# Patient Record
Sex: MALE | Race: WHITE | Employment: FULL TIME | URBAN - METROPOLITAN AREA
[De-identification: names, ages, dates, MRNs, and addresses within clinical notes are randomized per-mention and may not be internally consistent; named-entity substitution may affect disease eponyms.]

---

## 2020-03-19 ENCOUNTER — OFFICE VISIT (OUTPATIENT)
Dept: ORTHOPEDIC SURGERY | Age: 65
End: 2020-03-19
Payer: COMMERCIAL

## 2020-03-19 VITALS
SYSTOLIC BLOOD PRESSURE: 136 MMHG | HEART RATE: 67 BPM | DIASTOLIC BLOOD PRESSURE: 84 MMHG | TEMPERATURE: 97.9 F | WEIGHT: 265 LBS | HEIGHT: 70 IN | BODY MASS INDEX: 37.94 KG/M2

## 2020-03-19 PROCEDURE — 99204 OFFICE O/P NEW MOD 45 MIN: CPT | Performed by: ORTHOPAEDIC SURGERY

## 2020-03-19 RX ORDER — AMLODIPINE BESYLATE 10 MG/1
10 TABLET ORAL DAILY
COMMUNITY
Start: 2020-01-15

## 2020-03-19 RX ORDER — LOSARTAN POTASSIUM AND HYDROCHLOROTHIAZIDE 12.5; 1 MG/1; MG/1
1 TABLET ORAL DAILY
COMMUNITY
Start: 2020-01-15

## 2020-03-19 RX ORDER — SOY ISOFLAVONE 40 MG
500 TABLET ORAL 2 TIMES DAILY
COMMUNITY

## 2020-03-19 RX ORDER — BISOPROLOL FUMARATE 5 MG/1
5 TABLET ORAL DAILY
COMMUNITY
Start: 2020-01-15

## 2020-03-19 RX ORDER — AMOXICILLIN 500 MG
1 CAPSULE ORAL 2 TIMES DAILY
COMMUNITY

## 2020-03-19 NOTE — LETTER
Jean Pierre Vinson M.D. / Jenny Fuller. Meagan Ling M.D. Orthopaedic Surgeons - Board Certified  [address]  [address], [address], [address] [place] [address]  Phone:  [phone]    Fax:   [phone]    Ko Libby  Type of Procedure: LEFT TOTAL KNEE REPLACEMENT (Conformis)  Place of Surgery: [facility] Jazmine Wilkerson  Date of Surgery: 6/22/2020  Time of Surgery: 7:30 a.m. PREOPERATIVE INSTRUCTIONS FOR TOTAL JOINT REPLACEMENT  1. Read all the information provided for you in this packet and joint replacement folder; retain it for 2 years following surgery. 2.  Follow Dietary Handout: Patient Education Guide: Iron Replacement   Begin eating foods rich in iron one month before your surgery and continue for one month after surgery. 3.  Optional:  Not required by Dr. Meagan Ling. You may choose an over the counter iron supplement and start taking it at least one month prior to surgery and continue taking the iron supplement for one month following surgery. Feel free to contact your primary care physician for his / her approval or for a prescription. 4.  A nurse from [facility] will contact you and inform you of a date and time for your Pre-Admission Testing Day. You may contact them at [phone] [facility]) or [phone] [facility]) for any special requests. 5. Inform your family doctor as soon as your surgery date has been scheduled. See your doctor before surgery and obtain a letter of medical clearance for our office. Notify any specialists you are seeing and obtain a letter of clearance. You must see your pulmonary specialist prior to surgery if you have a condition called Sleep Apnea or use a CPAP machine at night. Please inform us if you have had problems with anesthesia in the past; especially with intubation. 6.  If you do not see a dentist regularly, see your dentist prior to surgery for a dental checkup and cleaning.  Please have all dental

## 2020-03-24 NOTE — PROGRESS NOTES
Chief Complaint:   Chief Complaint   Patient presents with    Knee Pain     Lt knee pain. Hx of knee scope 2013 for meniscus tear. Pain came back approx 3 years ago. Records in Media. States X-rays done last summer. No disc with patient. CONNIE Jackson is a 62 y.o. male, who presents for evaluation of left knee pain, symptoms have been chronic for the last few years, history is notable for left knee arthroscopic meniscectomy about 7 years ago since which time the patient has had occasional but usually tolerable symptoms. More recently the symptoms have become more consistent and progressive at times disabling the patient from independent activities of standing walking stairs transfers etc., no history of mechanical locking giving way. No recent trauma. Patient has occasionally tried some over-the-counter's with mixed results. He denies fever chills sweats chest pain dyspnea, has no other joint complaints. Allergies; medications; past medical, surgical, family, and social history; and problem list have been reviewed today and updated as indicated in this encounter - see below following Ortho specifics. Musculoskeletal: Obese otherwise healthy-appearing middle-aged male. Upper extremities are grossly intact leg lengths are equal and hip motion is painless bilaterally. Both knees show mild varus alignment more left than right knee, on the left side the deformity is partially correctable to neutral in mid flexion, there is no effusion there is mild crepitus and medial tenderness of the left more than right knee. Range of motion is normal although limited at the extremes of few degrees on both extension and flexion in the left knee.     Radiologic Studies: Weightbearing AP x-rays of both knees including lateral of the left today show varus deformities of both knees with early to moderate medial narrowing on the right, on the left side there is more advanced loss of medial cartilage with subchondral sclerosis and early osteophyte formation consistent with varus DJD bilateral knees left more than right. ASSESSMENT/PLAN:    Kim Floyd was seen today for knee pain. Diagnoses and all orders for this visit:    Primary osteoarthritis of left knee  -     XR Knee Bilateral Standing; Future  -     XR KNEE LEFT (1-2 VIEWS); Future       Treatment options were reviewed, at this point the patient is comfortable with observation and activity modification, we talked about injections and the possible progression toward eventual knee arthroplasty, weight loss is strongly advised as an imperative in either event. Low impact exercise also advised and instructed, questions asked and answered follow-up in 3 months if symptoms persist for further assessment possible injection possible total knee. Return in about 3 months (around 6/16/2020). Luisa Szymanski MD    3/24/2020  2:55 PM      There is no problem list on file for this patient. Past Medical History:   Diagnosis Date    Hypertension        Past Surgical History:   Procedure Laterality Date    FACIAL FRACTURE SURGERY      Maxilla    KNEE ARTHROSCOPY Left 2013    SHOULDER SURGERY Left 1981       Current Outpatient Medications   Medication Sig Dispense Refill    amLODIPine (NORVASC) 10 MG tablet       bisoprolol (ZEBETA) 5 MG tablet       losartan-hydrochlorothiazide (HYZAAR) 100-12.5 MG per tablet       Omega-3 Fatty Acids (FISH OIL) 1200 MG CAPS Take 1 capsule by mouth 2 times daily      Glucosamine-Chondroit-Vit C-Mn (GLUCOSAMINE CHONDR 1500 COMPLX PO) Take 1 capsule by mouth 2 times daily      l-arginine 500 MG capsule Take 500 mg by mouth 2 times daily      Cholecalciferol (VITAMIN D3 PO) Take 1 capsule by mouth 2 times daily      Multiple Vitamins-Minerals (CENTRUM ADULTS PO) Take 1 capsule by mouth daily       No current facility-administered medications for this visit.         Allergies   Allergen Reactions    Neosporin Effort normal. No stridor. No respiratory distress, no wheezes. Abdominal:  No abnormal distension. Neurological: Alert and oriented to person, place, and time. Skin: Skin is warm and dry. Psychiatric: Normal mood and affect.  Behavior is normal. Thought content normal.

## 2020-04-08 ENCOUNTER — TELEPHONE (OUTPATIENT)
Dept: ORTHOPEDIC SURGERY | Age: 65
End: 2020-04-08

## 2020-04-08 NOTE — TELEPHONE ENCOUNTER
LM on patient's VM regarding changing surgery date to July due to CT scan for Conformis needing to be done 6 weeks prior to surgery and due to Covid may not be able to schedule until end of May.

## 2020-04-13 ENCOUNTER — TELEPHONE (OUTPATIENT)
Dept: ORTHOPEDIC SURGERY | Age: 65
End: 2020-04-13

## 2020-05-19 ENCOUNTER — TELEPHONE (OUTPATIENT)
Dept: ORTHOPEDIC SURGERY | Age: 65
End: 2020-05-19

## 2020-05-20 ENCOUNTER — TELEPHONE (OUTPATIENT)
Dept: ORTHOPEDIC SURGERY | Age: 65
End: 2020-05-20

## 2020-05-20 NOTE — TELEPHONE ENCOUNTER
Spoke with Susana Juan @ [employer] [facility]. [facility]KA done as OPT or OBS.     Call Ref# for OPT [id number]  Call Ref# for OBS [id number]

## 2020-06-01 ENCOUNTER — HOSPITAL ENCOUNTER (OUTPATIENT)
Dept: CT IMAGING | Age: 65
Discharge: HOME | End: 2020-06-03
Payer: COMMERCIAL

## 2020-06-01 ENCOUNTER — TELEPHONE (OUTPATIENT)
Dept: ORTHOPEDIC SURGERY | Age: 65
End: 2020-06-01

## 2020-06-01 ENCOUNTER — HOSPITAL ENCOUNTER (OUTPATIENT)
Dept: PREADMISSION TESTING | Age: 65
Discharge: HOME | End: 2020-06-01
Payer: COMMERCIAL

## 2020-06-01 VITALS
HEIGHT: 70 IN | SYSTOLIC BLOOD PRESSURE: 139 MMHG | RESPIRATION RATE: 20 BRPM | TEMPERATURE: 98.5 F | WEIGHT: 268 LBS | BODY MASS INDEX: 38.37 KG/M2 | HEART RATE: 60 BPM | DIASTOLIC BLOOD PRESSURE: 66 MMHG | OXYGEN SATURATION: 95 %

## 2020-06-01 PROCEDURE — 73700 CT LOWER EXTREMITY W/O DYE: CPT

## 2020-06-01 RX ORDER — LORATADINE 10 MG/1
10 CAPSULE, LIQUID FILLED ORAL PRN
COMMUNITY

## 2020-06-01 ASSESSMENT — KOOS JR
GOING UP OR DOWN STAIRS: 2
STRAIGHTENING KNEE FULLY: 3
HOW SEVERE IS YOUR KNEE STIFFNESS AFTER FIRST WAKING IN MORNING: 3
RISING FROM SITTING: 3
TWISING OR PIVOTING ON KNEE: 4
STANDING UPRIGHT: 2
BENDING TO THE FLOOR TO PICK UP OBJECT: 2

## 2020-06-01 ASSESSMENT — PAIN DESCRIPTION - LOCATION: LOCATION: KNEE

## 2020-06-01 ASSESSMENT — PAIN SCALES - GENERAL: PAINLEVEL_OUTOF10: 4

## 2020-06-01 ASSESSMENT — PAIN DESCRIPTION - ORIENTATION: ORIENTATION: LEFT

## 2020-06-01 ASSESSMENT — PAIN DESCRIPTION - PAIN TYPE: TYPE: CHRONIC PAIN

## 2020-06-02 ENCOUNTER — ANESTHESIA EVENT (OUTPATIENT)
Dept: OPERATING ROOM | Age: 65
End: 2020-06-02
Payer: COMMERCIAL

## 2020-06-04 ENCOUNTER — TELEPHONE (OUTPATIENT)
Dept: ORTHOPEDIC SURGERY | Age: 65
End: 2020-06-04

## 2020-06-06 RX ORDER — ROPIVACAINE HYDROCHLORIDE 5 MG/ML
30 INJECTION, SOLUTION EPIDURAL; INFILTRATION; PERINEURAL ONCE
Status: CANCELLED | OUTPATIENT
Start: 2020-06-06

## 2020-06-06 RX ORDER — MIDAZOLAM HYDROCHLORIDE 1 MG/ML
1 INJECTION INTRAMUSCULAR; INTRAVENOUS PRN
Status: CANCELLED | OUTPATIENT
Start: 2020-06-06

## 2020-06-06 RX ORDER — CELECOXIB 100 MG/1
200 CAPSULE ORAL ONCE
Status: CANCELLED | OUTPATIENT
Start: 2020-06-06 | End: 2020-06-06

## 2020-06-06 RX ORDER — OXYCODONE HCL 10 MG/1
10 TABLET, FILM COATED, EXTENDED RELEASE ORAL ONCE
Status: CANCELLED | OUTPATIENT
Start: 2020-06-06 | End: 2020-06-06

## 2020-06-06 RX ORDER — GABAPENTIN 300 MG/1
600 CAPSULE ORAL ONCE
Status: CANCELLED | OUTPATIENT
Start: 2020-06-06 | End: 2020-06-06

## 2020-06-06 RX ORDER — ACETAMINOPHEN 500 MG
1000 TABLET ORAL ONCE
Status: CANCELLED | OUTPATIENT
Start: 2020-06-06 | End: 2020-06-06

## 2020-06-06 RX ORDER — FENTANYL CITRATE 50 UG/ML
50 INJECTION, SOLUTION INTRAMUSCULAR; INTRAVENOUS PRN
Status: CANCELLED | OUTPATIENT
Start: 2020-06-06

## 2020-06-24 ASSESSMENT — PROMIS GLOBAL HEALTH SCALE
IN GENERAL, WOULD YOU SAY YOUR QUALITY OF LIFE IS...[ON A SCALE OF 1 (POOR) TO 5 (EXCELLENT)]: 2
IN GENERAL, HOW WOULD YOU RATE YOUR MENTAL HEALTH, INCLUDING YOUR MOOD AND YOUR ABILITY TO THINK [ON A SCALE OF 1 (POOR) TO 5 (EXCELLENT)]?: 3
WHO IS THE PERSON COMPLETING THE PROMIS V1.1 SURVEY?: 0
IN GENERAL, HOW WOULD YOU RATE YOUR SATISFACTION WITH YOUR SOCIAL ACTIVITIES AND RELATIONSHIPS [ON A SCALE OF 1 (POOR) TO 5 (EXCELLENT)]?: 2
IN THE PAST 7 DAYS, HOW WOULD YOU RATE YOUR PAIN ON AVERAGE [ON A SCALE FROM 0 (NO PAIN) TO 10 (WORST IMAGINABLE PAIN)]?: 6
IN THE PAST 7 DAYS, HOW WOULD YOU RATE YOUR FATIGUE ON AVERAGE [ON A SCALE FROM 1 (NONE) TO 5 (VERY SEVERE)]?: 3
HOW IS THE PROMIS V1.1 BEING ADMINISTERED?: 2
IN GENERAL, HOW WOULD YOU RATE YOUR PHYSICAL HEALTH [ON A SCALE OF 1 (POOR) TO 5 (EXCELLENT)]?: 2
IN GENERAL, WOULD YOU SAY YOUR HEALTH IS...[ON A SCALE OF 1 (POOR) TO 5 (EXCELLENT)]: 3
IN GENERAL, PLEASE RATE HOW WELL YOU CARRY OUT YOUR USUAL SOCIAL ACTIVITIES (INCLUDES ACTIVITIES AT HOME, AT WORK, AND IN YOUR COMMUNITY, AND RESPONSIBILITIES AS A PARENT, CHILD, SPOUSE, EMPLOYEE, FRIEND, ETC) [ON A SCALE OF 1 (POOR) TO 5 (EXCELLENT)]?: 2
IN THE PAST 7 DAYS, HOW OFTEN HAVE YOU BEEN BOTHERED BY EMOTIONAL PROBLEMS, SUCH AS FEELING ANXIOUS, DEPRESSED, OR IRRITABLE [ON A SCALE FROM 1 (NEVER) TO 5 (ALWAYS)]?: 3
TO WHAT EXTENT ARE YOU ABLE TO CARRY OUT YOUR EVERYDAY PHYSICAL ACTIVITIES SUCH AS WALKING, CLIMBING STAIRS, CARRYING GROCERIES, OR MOVING A CHAIR [ON A SCALE OF 1 (NOT AT ALL) TO 5 (COMPLETELY)]?: 2

## 2020-06-24 ASSESSMENT — KOOS JR
BENDING TO THE FLOOR TO PICK UP OBJECT: 2
STRAIGHTENING KNEE FULLY: 2
HOW SEVERE IS YOUR KNEE STIFFNESS AFTER FIRST WAKING IN MORNING: 2
RISING FROM SITTING: 2
STANDING UPRIGHT: 2
GOING UP OR DOWN STAIRS: 3
TWISING OR PIVOTING ON KNEE: 1

## 2020-07-01 ENCOUNTER — TELEPHONE (OUTPATIENT)
Dept: ORTHOPEDIC SURGERY | Age: 65
End: 2020-07-01

## 2020-07-01 NOTE — TELEPHONE ENCOUNTER
7/01/2020 8:55 am Fax received from Dr. Anamika Dumont [phone] which includes:  An EKG strip dated 6/29/2020 Sinus Bradycardia HR 59. Labs completed at [facility]: COVID-19 IgG - Negative, SARS CoV2 - Negative, Nares culture - Negative MRSA, Prealbumin 23.7, Vitamin D Lvl 52.57 - WNL, CMP, PSA 1.190 - WNL, TSH 0.850 - WNL, CBC w/ Diff (Hgb. 15.3 Hct. 45.6 WBC 8.1 plt ct 251)    Above scanned to media

## 2020-07-02 ENCOUNTER — PREP FOR PROCEDURE (OUTPATIENT)
Dept: ORTHOPEDIC SURGERY | Age: 65
End: 2020-07-02

## 2020-07-02 DIAGNOSIS — M17.12 PRIMARY OSTEOARTHRITIS OF LEFT KNEE: Primary | ICD-10-CM

## 2020-07-02 RX ORDER — SODIUM CHLORIDE 0.9 % (FLUSH) 0.9 %
10 SYRINGE (ML) INJECTION PRN
Status: CANCELLED | OUTPATIENT
Start: 2020-07-02

## 2020-07-02 RX ORDER — GABAPENTIN 100 MG/1
600 CAPSULE ORAL ONCE
Status: CANCELLED | OUTPATIENT
Start: 2020-07-02 | End: 2020-07-02

## 2020-07-02 RX ORDER — ACETAMINOPHEN 325 MG/1
1000 TABLET ORAL ONCE
Status: CANCELLED | OUTPATIENT
Start: 2020-07-02 | End: 2020-07-02

## 2020-07-02 RX ORDER — SODIUM CHLORIDE 0.9 % (FLUSH) 0.9 %
10 SYRINGE (ML) INJECTION EVERY 12 HOURS SCHEDULED
Status: CANCELLED | OUTPATIENT
Start: 2020-07-02

## 2020-07-02 RX ORDER — CELECOXIB 200 MG/1
200 CAPSULE ORAL ONCE
Status: CANCELLED | OUTPATIENT
Start: 2020-07-02 | End: 2020-07-02

## 2020-07-02 RX ORDER — SODIUM CHLORIDE 9 MG/ML
INJECTION, SOLUTION INTRAVENOUS CONTINUOUS
Status: CANCELLED | OUTPATIENT
Start: 2020-07-02

## 2020-07-09 ASSESSMENT — KOOS JR
BENDING TO THE FLOOR TO PICK UP OBJECT: 2
GOING UP OR DOWN STAIRS: 3
STRAIGHTENING KNEE FULLY: 2
HOW SEVERE IS YOUR KNEE STIFFNESS AFTER FIRST WAKING IN MORNING: 1
RISING FROM SITTING: 2
STANDING UPRIGHT: 2
TWISING OR PIVOTING ON KNEE: 2

## 2020-07-09 NOTE — PROGRESS NOTES
Spoke with pt regarding his covid test that was done. Pt had antibody test done not covid screen swab. Pt is going today to have correct test done at hospital where he lives.

## 2020-07-09 NOTE — PROGRESS NOTES
Have you been tested for COVID  Yes         07/08/2020 preop test PCP    Have you been told you were positive for COVID No  Have you had any known exposure to someone that is positive for COVID No  Do you have a cough                   No              Do you have shortness of breath No                 Do you have a sore throat            No                Are you having chills                    No                Are you having muscle aches. No                    Please come to the hospital wearing a mask and have your significant other wear a mask as well. Both of you should check your temperature before leaving to come here,  if it is 100 or higher please call [phone] for instruction.

## 2020-07-09 NOTE — PROGRESS NOTES
[facility] PRE-ADMISSION TESTING INSTRUCTIONS    The Preadmission Testing patient is instructed accordingly using the following criteria (check applicable):    ARRIVAL INSTRUCTIONS:  [x] Parking the day of Surgery is located in the Main Entrance lot. Upon entering the door, make an immediate right to the surgery reception desk    [x] Bring photo ID and insurance card    [] Bring in a copy of Living will or Durable Power of  papers. [x] Please be sure to arrange for responsible adult to provide transportation to and from the hospital    [] Please arrange for responsible adult to be with you for the 24 hour period post procedure due to having anesthesia      GENERAL INSTRUCTIONS:    [x] Nothing by mouth after midnight, including gum, candy, mints or water    [] You may brush your teeth, but do not swallow any water    [x] Take medications as instructed with 1-2 oz of water    [x] Stop herbal supplements and vitamins 5 days prior to procedure    [x] Follow preop dosing of blood thinners per physician instructions    [] Take 1/2 dose of evening insulin, but no insulin after midnight    [] No oral diabetic medications after midnight    [] If diabetic and have low blood sugar or feel symptomatic, take 1-2oz apple juice only    [] Bring inhalers day of surgery    [] Bring C-PAP/ Bi-Pap day of surgery    [] Bring urine specimen day of surgery    [] Shower or bath with soap, lather and rinse well, AM of Surgery, no lotion, powders or creams to surgical site    [] Follow bowel prep as instructed per surgeon    [x] No tobacco products within 24 hours of surgery     [x] No alcohol or illegal drug use within 24 hours of surgery.     [x] Jewelry, body piercing's, eyeglasses, contact lenses and dentures are not permitted into surgery (bring cases)      [] Please do not wear any nail polish, make up or hair products on the day of surgery    [x] If not already done, you can expect a call from

## 2020-07-10 ENCOUNTER — TELEPHONE (OUTPATIENT)
Dept: ORTHOPEDIC SURGERY | Age: 65
End: 2020-07-10

## 2020-07-10 NOTE — TELEPHONE ENCOUNTER
Please check with PAT for policy for elective surgery without covid results - I believe we can move surgery to 7/20 if not having results by 7/13 is an issue.

## 2020-07-10 NOTE — TELEPHONE ENCOUNTER
7/10/2020 10:35 am Proceed with surgery, Covid test is pending    7/10/2020 10:40 am Per Dr. Tracy Kim request: Phoned and spoke with patient who denies: fever, chills, coughing, SOB or body aches. Family is well. Patient denies travel outside of the country and denies exposure to anybody who has traveled outside the country.     7/10/2020 10:44 am Phoned and informed Mark Jacques RN, PAT-RD of above

## 2020-07-10 NOTE — TELEPHONE ENCOUNTER
7/10/2020 9:30 am Received a fax from [facility] [phone]: A copy of office notes dated July 1, 2020 and electronically sgned by Milagros Schwab DO. \"Cleared\" for surgery. \"    7/10/2020 9:38 am Above faxed to [facility] [phone]

## 2020-07-10 NOTE — H&P (VIEW-ONLY)
Department of Orthopedic Surgery  Attending History and Physical        CHIEF COMPLAINT: Left knee pain    Reason for Admission: Left total knee arthroplasty    History Obtained From: patient, electronic medical record    HISTORY OF PRESENT ILLNESS:      The patient is a 60 y.o. male with significant past medical history of hypertension and postoperative nausea and vomiting who presents with left knee pain, symptoms have been chronic for the last few years, history is notable for left knee arthroscopic meniscectomy about 7 years ago since which time the patient has had occasional but usually tolerable symptoms. More recently the symptoms have become more consistent and progressive at times disabling the patient from independent activities of standing walking stairs transfers etc., no history of mechanical locking giving way. No recent trauma. Patient has occasionally tried some over-the-counter's with mixed results. He denies fever chills sweats chest pain dyspnea, has no other joint complaints.     Past Medical History:        Diagnosis Date    Arthritis     Hypertension     PONV (postoperative nausea and vomiting)      Past Surgical History:        Procedure Laterality Date    FACIAL FRACTURE SURGERY      Maxilla    KNEE ARTHROSCOPY Left 2013    SHOULDER SURGERY Left 1981    WISDOM TOOTH EXTRACTION       Immunizations:              Influenza:  Not indicated            Pneumococcal Polysaccharide:  Not indicated    Medications Prior to Admission:     Current Outpatient Medications:     loratadine (CLARITIN) 10 MG capsule, Take 10 mg by mouth as needed, Disp: , Rfl:     amLODIPine (NORVASC) 10 MG tablet, Take 10 mg by mouth daily Instructed to take am of procedure, Disp: , Rfl:     bisoprolol (ZEBETA) 5 MG tablet, Take 5 mg by mouth daily Instructed to take am of procedure, Disp: , Rfl:     losartan-hydrochlorothiazide (HYZAAR) 100-12.5 MG per tablet, Take 1 tablet by mouth daily , Disp: , Rfl:    Omega-3 Fatty Acids (FISH OIL) 1200 MG CAPS, Take 1 capsule by mouth 2 times daily Ld 7/8/2020, Disp: , Rfl:     Glucosamine-Chondroit-Vit C-Mn (GLUCOSAMINE CHONDR 1500 COMPLX PO), Take 1 capsule by mouth 2 times daily Ld 7/8/2020, Disp: , Rfl:     l-arginine 500 MG capsule, Take 500 mg by mouth 2 times daily Ld 7/8/2020, Disp: , Rfl:     Cholecalciferol (VITAMIN D3 PO), Take 1 capsule by mouth 2 times daily Ld 7/8/2020, Disp: , Rfl:     Multiple Vitamins-Minerals (CENTRUM ADULTS PO), Take 1 capsule by mouth daily Ld 7/8/2020, Disp: , Rfl:       Allergies:  Neosporin [neomycin-polymyxin-gramicidin]    Social History:   TOBACCO:   reports that he has never smoked. He has never used smokeless tobacco.  ETOH:   reports current alcohol use of about 4.0 standard drinks of alcohol per week. Patient currently lives in a private home in [place] than 2 hours away. Family History:   No family history on file. REVIEW OF SYSTEMS:  Constitutional: Negative for chills, diaphoresis, fatigue, fever and unexpected weight change. Respiratory: Negative for cough, shortness of breath and wheezing. Cardiovascular: Negative for chest pain and palpitations. Neurological: Negative for dizziness, syncope, weakness and numbness. Musculoskeletal: see HPI   All other systems on review negative or noncontributory. PHYSICAL EXAM:  VITALS:  There were no vitals taken for this visit. CONSTITUTIONAL: Healthy middle-aged male  NECK:  supple, symmetrical, trachea midline  SKIN: intact no rashes sores ulcers. LUNGS:  clear no audible rales or rhonchi  CARDIOVASCULAR:  Regular rate and rhythm  MUSCULOSKELETAL:  Upper extremities are grossly intact leg lengths are equal and hip motion is painless bilaterally.   Both knees show mild varus alignment more left than right knee, on the left side the deformity is partially correctable to neutral in mid flexion, there is no effusion there is mild crepitus and medial tenderness of the left more than right knee. Range of motion is normal although limited at the extremes of few degrees on both extension and flexion in the left knee. NEUROLOGIC:  Grossly intact motor and sensation cranial and all extremities  MENTAL STATUS:  Level of Alertness:   awake  ORIENTATION:  to person, place, time    DATA:  CBC with Differential:  No results found for: WBC, RBC, HGB, HCT, PLT, MCV, MCH, MCHC, RDW, NRBC, SEGSPCT, BANDSPCT, BLASTSPCT, METASPCT, LYMPHOPCT, PROMYELOPCT, MONOPCT, MYELOPCT, EOSPCT, BASOPCT, MONOSABS, LYMPHSABS, EOSABS, BASOSABS, DIFFTYPE    Radiographs:  Weightbearing AP x-rays of both knees including lateral of the left today show varus deformities of both knees with early to moderate medial narrowing on the right, on the left side there is more advanced loss of medial cartilage with subchondral sclerosis and early osteophyte formation consistent with varus DJD bilateral knees left more than right. ASSESSMENT AND PLAN:    Primary osteoarthritis of left knee    The alternative of total knee arthroplasty was reviewed with the patient including the anticipated procedure itself, likely risks benefits and probable outcomes, we also discussed at his request the option of a customizable implant and details pertinent to that. Patient states he has researched this alternative relative to more conventional procedures including no use of robotics and requests the approach of patient specific implants. This will be performed on an admission basis anticipating a 2 night stay primarily due to the patient's home being significant distance from the hospital facility. It is the physician judgment that the procedure should be done as an inpatient because of severe systemic disease and or residents distance from the facility that precludes the patient from meeting ASA ambulatory procedure guidelines, and patient safety would be impacted if performed at a lower level of care.

## 2020-07-10 NOTE — TELEPHONE ENCOUNTER
7/10/2020 It was reported by LUCAS on 7/09/2020: The wrong Covid-19 test was ordered by the patient's PCP which was done on 6/29/2020 - negative for antibodies. A nasal smear is the correct test.    7/10/2020 8:37 am Phoned and spoke with patient who reports: Had the nasal smear done late yesterday 7/09/2020 at the hospital where I live. The  informed patient: the results of the test takes 4 days and would not be available in time for his surgery. Patient reports he has made plans for this surgery and arrangements have been made for the care of his grandson. Patient does not want this surgery cancelled. 7/10/2020 8:27 am Phoned and spoke with Monika Gonzáles RN who states, if test results not available, doctor will have to write, 'Necessary to proceed with surgery.'     7/10/2020 8:55 am Spoke with Munira at Dr. [facility] office [phone] who reports: [facility] has faxed medical clearance to Dr. Lola Bellamy office twice. Informed Munira: Never received. Munira will refax note this am    7/10/2020 9:00 Phoned [facility] in [place], [place] / Oliver Rhodes with Harmony Roque  who reports: Covid -19 nasal culture was completed on this patient late yesterday after the  had already left. Specimen remains in the lab.  not due to arrive until 3:30 or 4:00 pm today, then the specimen will arrive in a lab in [place], [place]. Do not expect the results back in time for surgery on Monday due to there is a backlog. Still waiting on results on specimens that were done on 6/24.

## 2020-07-13 ENCOUNTER — HOSPITAL ENCOUNTER (OUTPATIENT)
Age: 65
Setting detail: OBSERVATION
Discharge: HOME | End: 2020-07-15
Attending: ORTHOPAEDIC SURGERY | Admitting: ORTHOPAEDIC SURGERY
Payer: COMMERCIAL

## 2020-07-13 ENCOUNTER — ANESTHESIA (OUTPATIENT)
Dept: OPERATING ROOM | Age: 65
End: 2020-07-13
Payer: COMMERCIAL

## 2020-07-13 VITALS — DIASTOLIC BLOOD PRESSURE: 57 MMHG | TEMPERATURE: 94.8 F | SYSTOLIC BLOOD PRESSURE: 108 MMHG | OXYGEN SATURATION: 90 %

## 2020-07-13 PROBLEM — Z96.652 STATUS POST TOTAL LEFT KNEE REPLACEMENT: Status: ACTIVE | Noted: 2020-07-13

## 2020-07-13 LAB — SARS-COV-2, NAAT: NOT DETECTED

## 2020-07-13 PROCEDURE — 6360000002 HC RX W HCPCS: Performed by: ORTHOPAEDIC SURGERY

## 2020-07-13 PROCEDURE — 3700000001 HC ADD 15 MINUTES (ANESTHESIA): Performed by: ORTHOPAEDIC SURGERY

## 2020-07-13 PROCEDURE — 1200000000 HC SEMI PRIVATE

## 2020-07-13 PROCEDURE — 7100000000 HC PACU RECOVERY - FIRST 15 MIN: Performed by: ORTHOPAEDIC SURGERY

## 2020-07-13 PROCEDURE — 2580000003 HC RX 258: Performed by: ORTHOPAEDIC SURGERY

## 2020-07-13 PROCEDURE — U0002 COVID-19 LAB TEST NON-CDC: HCPCS

## 2020-07-13 PROCEDURE — 2709999900 HC NON-CHARGEABLE SUPPLY: Performed by: ORTHOPAEDIC SURGERY

## 2020-07-13 PROCEDURE — G0378 HOSPITAL OBSERVATION PER HR: HCPCS

## 2020-07-13 PROCEDURE — 6360000002 HC RX W HCPCS: Performed by: ANESTHESIOLOGY

## 2020-07-13 PROCEDURE — 97535 SELF CARE MNGMENT TRAINING: CPT

## 2020-07-13 PROCEDURE — 6360000002 HC RX W HCPCS: Performed by: NURSE ANESTHETIST, CERTIFIED REGISTERED

## 2020-07-13 PROCEDURE — 88311 DECALCIFY TISSUE: CPT

## 2020-07-13 PROCEDURE — 3700000000 HC ANESTHESIA ATTENDED CARE: Performed by: ORTHOPAEDIC SURGERY

## 2020-07-13 PROCEDURE — 3600000015 HC SURGERY LEVEL 5 ADDTL 15MIN: Performed by: ORTHOPAEDIC SURGERY

## 2020-07-13 PROCEDURE — 6370000000 HC RX 637 (ALT 250 FOR IP): Performed by: ORTHOPAEDIC SURGERY

## 2020-07-13 PROCEDURE — 27447 TOTAL KNEE ARTHROPLASTY: CPT | Performed by: ORTHOPAEDIC SURGERY

## 2020-07-13 PROCEDURE — 88305 TISSUE EXAM BY PATHOLOGIST: CPT

## 2020-07-13 PROCEDURE — 2500000003 HC RX 250 WO HCPCS: Performed by: NURSE ANESTHETIST, CERTIFIED REGISTERED

## 2020-07-13 PROCEDURE — 2500000003 HC RX 250 WO HCPCS: Performed by: ORTHOPAEDIC SURGERY

## 2020-07-13 PROCEDURE — 2500000003 HC RX 250 WO HCPCS

## 2020-07-13 PROCEDURE — C1776 JOINT DEVICE (IMPLANTABLE): HCPCS | Performed by: ORTHOPAEDIC SURGERY

## 2020-07-13 PROCEDURE — 7100000001 HC PACU RECOVERY - ADDTL 15 MIN: Performed by: ORTHOPAEDIC SURGERY

## 2020-07-13 PROCEDURE — C1713 ANCHOR/SCREW BN/BN,TIS/BN: HCPCS | Performed by: ORTHOPAEDIC SURGERY

## 2020-07-13 PROCEDURE — 97530 THERAPEUTIC ACTIVITIES: CPT

## 2020-07-13 PROCEDURE — 97161 PT EVAL LOW COMPLEX 20 MIN: CPT

## 2020-07-13 PROCEDURE — 2720000010 HC SURG SUPPLY STERILE: Performed by: ORTHOPAEDIC SURGERY

## 2020-07-13 PROCEDURE — 99253 IP/OBS CNSLTJ NEW/EST LOW 45: CPT | Performed by: INTERNAL MEDICINE

## 2020-07-13 PROCEDURE — 3600000005 HC SURGERY LEVEL 5 BASE: Performed by: ORTHOPAEDIC SURGERY

## 2020-07-13 PROCEDURE — 97165 OT EVAL LOW COMPLEX 30 MIN: CPT

## 2020-07-13 DEVICE — IMPLANTABLE DEVICE: Type: IMPLANTABLE DEVICE | Site: KNEE | Status: FUNCTIONAL

## 2020-07-13 DEVICE — INSERT KNEE LAT A ITOTAL: Type: IMPLANTABLE DEVICE | Site: KNEE | Status: FUNCTIONAL

## 2020-07-13 DEVICE — CEMENT BNE 40 GM RADIOPAQUE BA SIMPLEX P: Type: IMPLANTABLE DEVICE | Site: KNEE | Status: FUNCTIONAL

## 2020-07-13 DEVICE — IMPLANT PAT 38 MM X 85 MM CR IPOLY ITOTAL: Type: IMPLANTABLE DEVICE | Site: KNEE | Status: FUNCTIONAL

## 2020-07-13 RX ORDER — OXYCODONE HYDROCHLORIDE AND ACETAMINOPHEN 5; 325 MG/1; MG/1
1 TABLET ORAL EVERY 6 HOURS PRN
Qty: 28 TABLET | Refills: 0 | Status: SHIPPED | OUTPATIENT
Start: 2020-07-13 | End: 2020-07-20

## 2020-07-13 RX ORDER — FENTANYL CITRATE 50 UG/ML
25 INJECTION, SOLUTION INTRAMUSCULAR; INTRAVENOUS EVERY 5 MIN PRN
Status: DISCONTINUED | OUTPATIENT
Start: 2020-07-13 | End: 2020-07-13 | Stop reason: HOSPADM

## 2020-07-13 RX ORDER — GABAPENTIN 300 MG/1
600 CAPSULE ORAL ONCE
Status: COMPLETED | OUTPATIENT
Start: 2020-07-13 | End: 2020-07-13

## 2020-07-13 RX ORDER — SODIUM CHLORIDE 9 MG/ML
INJECTION, SOLUTION INTRAVENOUS CONTINUOUS
Status: DISCONTINUED | OUTPATIENT
Start: 2020-07-13 | End: 2020-07-15 | Stop reason: HOSPADM

## 2020-07-13 RX ORDER — ACETAMINOPHEN 325 MG/1
650 TABLET ORAL EVERY 6 HOURS
Status: DISCONTINUED | OUTPATIENT
Start: 2020-07-13 | End: 2020-07-15 | Stop reason: HOSPADM

## 2020-07-13 RX ORDER — GLYCOPYRROLATE 1 MG/5 ML
SYRINGE (ML) INTRAVENOUS PRN
Status: DISCONTINUED | OUTPATIENT
Start: 2020-07-13 | End: 2020-07-13 | Stop reason: SDUPTHER

## 2020-07-13 RX ORDER — LIDOCAINE HYDROCHLORIDE 10 MG/ML
INJECTION, SOLUTION INFILTRATION; PERINEURAL
Status: COMPLETED
Start: 2020-07-13 | End: 2020-07-13

## 2020-07-13 RX ORDER — AMLODIPINE BESYLATE 10 MG/1
10 TABLET ORAL DAILY
Status: DISCONTINUED | OUTPATIENT
Start: 2020-07-13 | End: 2020-07-15 | Stop reason: HOSPADM

## 2020-07-13 RX ORDER — SODIUM CHLORIDE 0.9 % (FLUSH) 0.9 %
10 SYRINGE (ML) INJECTION EVERY 12 HOURS SCHEDULED
Status: DISCONTINUED | OUTPATIENT
Start: 2020-07-13 | End: 2020-07-15 | Stop reason: HOSPADM

## 2020-07-13 RX ORDER — ACETAMINOPHEN 500 MG
1000 TABLET ORAL ONCE
Status: DISCONTINUED | OUTPATIENT
Start: 2020-07-13 | End: 2020-07-13 | Stop reason: SDUPTHER

## 2020-07-13 RX ORDER — CALCIUM CHLORIDE 100 MG/ML
INJECTION INTRAVENOUS; INTRAVENTRICULAR PRN
Status: DISCONTINUED | OUTPATIENT
Start: 2020-07-13 | End: 2020-07-13 | Stop reason: ALTCHOICE

## 2020-07-13 RX ORDER — HYDROCHLOROTHIAZIDE 12.5 MG/1
12.5 TABLET ORAL DAILY
Status: DISCONTINUED | OUTPATIENT
Start: 2020-07-13 | End: 2020-07-15 | Stop reason: HOSPADM

## 2020-07-13 RX ORDER — PROMETHAZINE HYDROCHLORIDE 25 MG/ML
6.25 INJECTION, SOLUTION INTRAMUSCULAR; INTRAVENOUS PRN
Status: DISCONTINUED | OUTPATIENT
Start: 2020-07-13 | End: 2020-07-13 | Stop reason: HOSPADM

## 2020-07-13 RX ORDER — MIDAZOLAM HYDROCHLORIDE 1 MG/ML
1 INJECTION INTRAMUSCULAR; INTRAVENOUS PRN
Status: COMPLETED | OUTPATIENT
Start: 2020-07-13 | End: 2020-07-13

## 2020-07-13 RX ORDER — OXYCODONE HYDROCHLORIDE 5 MG/1
5 TABLET ORAL EVERY 4 HOURS PRN
Status: DISCONTINUED | OUTPATIENT
Start: 2020-07-13 | End: 2020-07-15 | Stop reason: HOSPADM

## 2020-07-13 RX ORDER — LOSARTAN POTASSIUM 50 MG/1
100 TABLET ORAL DAILY
Status: DISCONTINUED | OUTPATIENT
Start: 2020-07-13 | End: 2020-07-15 | Stop reason: HOSPADM

## 2020-07-13 RX ORDER — LIDOCAINE HYDROCHLORIDE 10 MG/ML
5 INJECTION, SOLUTION INFILTRATION; PERINEURAL ONCE
Status: COMPLETED | OUTPATIENT
Start: 2020-07-13 | End: 2020-07-13

## 2020-07-13 RX ORDER — SODIUM CHLORIDE 0.9 % (FLUSH) 0.9 %
10 SYRINGE (ML) INJECTION PRN
Status: DISCONTINUED | OUTPATIENT
Start: 2020-07-13 | End: 2020-07-15 | Stop reason: HOSPADM

## 2020-07-13 RX ORDER — SENNA AND DOCUSATE SODIUM 50; 8.6 MG/1; MG/1
1 TABLET, FILM COATED ORAL 2 TIMES DAILY
Status: DISCONTINUED | OUTPATIENT
Start: 2020-07-13 | End: 2020-07-15 | Stop reason: HOSPADM

## 2020-07-13 RX ORDER — LOSARTAN POTASSIUM AND HYDROCHLOROTHIAZIDE 12.5; 1 MG/1; MG/1
1 TABLET ORAL DAILY
Status: DISCONTINUED | OUTPATIENT
Start: 2020-07-13 | End: 2020-07-13 | Stop reason: CLARIF

## 2020-07-13 RX ORDER — ASPIRIN 81 MG/1
81 TABLET ORAL 2 TIMES DAILY
Qty: 56 TABLET | Refills: 0 | Status: SHIPPED | OUTPATIENT
Start: 2020-07-13 | End: 2020-11-12

## 2020-07-13 RX ORDER — SODIUM CHLORIDE 0.9 % (FLUSH) 0.9 %
10 SYRINGE (ML) INJECTION PRN
Status: DISCONTINUED | OUTPATIENT
Start: 2020-07-13 | End: 2020-07-13 | Stop reason: HOSPADM

## 2020-07-13 RX ORDER — OXYCODONE HYDROCHLORIDE AND ACETAMINOPHEN 5; 325 MG/1; MG/1
1 TABLET ORAL PRN
Status: DISCONTINUED | OUTPATIENT
Start: 2020-07-13 | End: 2020-07-13 | Stop reason: HOSPADM

## 2020-07-13 RX ORDER — FENTANYL CITRATE 50 UG/ML
50 INJECTION, SOLUTION INTRAMUSCULAR; INTRAVENOUS PRN
Status: COMPLETED | OUTPATIENT
Start: 2020-07-13 | End: 2020-07-13

## 2020-07-13 RX ORDER — GABAPENTIN 300 MG/1
600 CAPSULE ORAL ONCE
Status: DISCONTINUED | OUTPATIENT
Start: 2020-07-13 | End: 2020-07-13 | Stop reason: SDUPTHER

## 2020-07-13 RX ORDER — ACETAMINOPHEN 500 MG
1000 TABLET ORAL ONCE
Status: COMPLETED | OUTPATIENT
Start: 2020-07-13 | End: 2020-07-13

## 2020-07-13 RX ORDER — ROPIVACAINE HYDROCHLORIDE 5 MG/ML
INJECTION, SOLUTION EPIDURAL; INFILTRATION; PERINEURAL
Status: COMPLETED | OUTPATIENT
Start: 2020-07-13 | End: 2020-07-13

## 2020-07-13 RX ORDER — LIDOCAINE HYDROCHLORIDE 10 MG/ML
INJECTION, SOLUTION INFILTRATION; PERINEURAL PRN
Status: DISCONTINUED | OUTPATIENT
Start: 2020-07-13 | End: 2020-07-13

## 2020-07-13 RX ORDER — PROPOFOL 10 MG/ML
INJECTION, EMULSION INTRAVENOUS CONTINUOUS PRN
Status: DISCONTINUED | OUTPATIENT
Start: 2020-07-13 | End: 2020-07-13 | Stop reason: SDUPTHER

## 2020-07-13 RX ORDER — CELECOXIB 100 MG/1
200 CAPSULE ORAL ONCE
Status: DISCONTINUED | OUTPATIENT
Start: 2020-07-13 | End: 2020-07-13 | Stop reason: SDUPTHER

## 2020-07-13 RX ORDER — SODIUM CHLORIDE 9 MG/ML
INJECTION, SOLUTION INTRAVENOUS CONTINUOUS
Status: DISCONTINUED | OUTPATIENT
Start: 2020-07-13 | End: 2020-07-13

## 2020-07-13 RX ORDER — MORPHINE SULFATE 2 MG/ML
2 INJECTION, SOLUTION INTRAMUSCULAR; INTRAVENOUS
Status: DISCONTINUED | OUTPATIENT
Start: 2020-07-13 | End: 2020-07-15 | Stop reason: HOSPADM

## 2020-07-13 RX ORDER — ROPIVACAINE HYDROCHLORIDE 5 MG/ML
30 INJECTION, SOLUTION EPIDURAL; INFILTRATION; PERINEURAL ONCE
Status: COMPLETED | OUTPATIENT
Start: 2020-07-13 | End: 2020-07-13

## 2020-07-13 RX ORDER — CELECOXIB 100 MG/1
200 CAPSULE ORAL ONCE
Status: COMPLETED | OUTPATIENT
Start: 2020-07-13 | End: 2020-07-13

## 2020-07-13 RX ORDER — OXYCODONE HCL 10 MG/1
10 TABLET, FILM COATED, EXTENDED RELEASE ORAL ONCE
Status: DISCONTINUED | OUTPATIENT
Start: 2020-07-13 | End: 2020-07-13

## 2020-07-13 RX ORDER — MEPERIDINE HYDROCHLORIDE 25 MG/ML
12.5 INJECTION INTRAMUSCULAR; INTRAVENOUS; SUBCUTANEOUS EVERY 10 MIN PRN
Status: DISCONTINUED | OUTPATIENT
Start: 2020-07-13 | End: 2020-07-13 | Stop reason: HOSPADM

## 2020-07-13 RX ORDER — ONDANSETRON 2 MG/ML
INJECTION INTRAMUSCULAR; INTRAVENOUS PRN
Status: DISCONTINUED | OUTPATIENT
Start: 2020-07-13 | End: 2020-07-13 | Stop reason: SDUPTHER

## 2020-07-13 RX ORDER — DEXAMETHASONE SODIUM PHOSPHATE 10 MG/ML
8 INJECTION, SOLUTION INTRAMUSCULAR; INTRAVENOUS ONCE
Status: DISCONTINUED | OUTPATIENT
Start: 2020-07-13 | End: 2020-07-13 | Stop reason: HOSPADM

## 2020-07-13 RX ORDER — SODIUM CHLORIDE 0.9 % (FLUSH) 0.9 %
10 SYRINGE (ML) INJECTION EVERY 12 HOURS SCHEDULED
Status: DISCONTINUED | OUTPATIENT
Start: 2020-07-13 | End: 2020-07-13 | Stop reason: HOSPADM

## 2020-07-13 RX ORDER — DIPHENHYDRAMINE HYDROCHLORIDE 50 MG/ML
12.5 INJECTION INTRAMUSCULAR; INTRAVENOUS
Status: DISCONTINUED | OUTPATIENT
Start: 2020-07-13 | End: 2020-07-13 | Stop reason: HOSPADM

## 2020-07-13 RX ADMIN — PROPOFOL 100 MCG/KG/MIN: 10 INJECTION, EMULSION INTRAVENOUS at 13:05

## 2020-07-13 RX ADMIN — ROPIVACAINE HYDROCHLORIDE 30 ML: 5 INJECTION, SOLUTION EPIDURAL; INFILTRATION; PERINEURAL at 12:11

## 2020-07-13 RX ADMIN — ONDANSETRON HYDROCHLORIDE 4 MG: 2 INJECTION, SOLUTION INTRAMUSCULAR; INTRAVENOUS at 15:12

## 2020-07-13 RX ADMIN — TRANEXAMIC ACID 1000 MG: 1 INJECTION, SOLUTION INTRAVENOUS at 13:10

## 2020-07-13 RX ADMIN — LIDOCAINE HYDROCHLORIDE 200 MG: 10 INJECTION, SOLUTION INFILTRATION; PERINEURAL at 12:00

## 2020-07-13 RX ADMIN — PHENYLEPHRINE HYDROCHLORIDE 100 MCG: 10 INJECTION INTRAVENOUS at 13:58

## 2020-07-13 RX ADMIN — SODIUM CHLORIDE: 9 INJECTION, SOLUTION INTRAVENOUS at 13:40

## 2020-07-13 RX ADMIN — CELECOXIB 200 MG: 100 CAPSULE ORAL at 11:11

## 2020-07-13 RX ADMIN — SODIUM CHLORIDE: 9 INJECTION, SOLUTION INTRAVENOUS at 12:30

## 2020-07-13 RX ADMIN — PHENYLEPHRINE HYDROCHLORIDE 200 MCG: 10 INJECTION INTRAVENOUS at 13:17

## 2020-07-13 RX ADMIN — MIDAZOLAM HYDROCHLORIDE 1 MG: 1 INJECTION, SOLUTION INTRAMUSCULAR; INTRAVENOUS at 11:57

## 2020-07-13 RX ADMIN — DOCUSATE SODIUM 50 MG AND SENNOSIDES 8.6 MG 1 TABLET: 8.6; 5 TABLET, FILM COATED ORAL at 20:38

## 2020-07-13 RX ADMIN — SODIUM CHLORIDE: 9 INJECTION, SOLUTION INTRAVENOUS at 14:29

## 2020-07-13 RX ADMIN — PHENYLEPHRINE HYDROCHLORIDE 150 MCG: 10 INJECTION INTRAVENOUS at 13:41

## 2020-07-13 RX ADMIN — METOPROLOL TARTRATE 25 MG: 25 TABLET, FILM COATED ORAL at 20:38

## 2020-07-13 RX ADMIN — FENTANYL CITRATE 25 MCG: 50 INJECTION, SOLUTION INTRAMUSCULAR; INTRAVENOUS at 12:03

## 2020-07-13 RX ADMIN — ASPIRIN 325 MG: 325 TABLET, DELAYED RELEASE ORAL at 20:38

## 2020-07-13 RX ADMIN — ROPIVACAINE HYDROCHLORIDE 30 ML: 5 INJECTION, SOLUTION EPIDURAL; INFILTRATION; PERINEURAL at 12:13

## 2020-07-13 RX ADMIN — ACETAMINOPHEN 650 MG: 325 TABLET ORAL at 23:42

## 2020-07-13 RX ADMIN — Medication 0.2 MG: at 13:16

## 2020-07-13 RX ADMIN — ACETAMINOPHEN 650 MG: 325 TABLET ORAL at 17:46

## 2020-07-13 RX ADMIN — PHENYLEPHRINE HYDROCHLORIDE 100 MCG: 10 INJECTION INTRAVENOUS at 13:47

## 2020-07-13 RX ADMIN — CEFAZOLIN 3 G: 10 INJECTION, POWDER, FOR SOLUTION INTRAVENOUS at 12:42

## 2020-07-13 RX ADMIN — Medication 2 G: at 20:38

## 2020-07-13 RX ADMIN — Medication 10 ML: at 23:29

## 2020-07-13 RX ADMIN — FENTANYL CITRATE 50 MCG: 50 INJECTION, SOLUTION INTRAMUSCULAR; INTRAVENOUS at 11:59

## 2020-07-13 RX ADMIN — PHENYLEPHRINE HYDROCHLORIDE 150 MCG: 10 INJECTION INTRAVENOUS at 13:31

## 2020-07-13 RX ADMIN — SODIUM CHLORIDE: 9 INJECTION, SOLUTION INTRAVENOUS at 11:02

## 2020-07-13 RX ADMIN — ACETAMINOPHEN 1000 MG: 500 TABLET ORAL at 11:11

## 2020-07-13 RX ADMIN — FENTANYL CITRATE 50 MCG: 50 INJECTION, SOLUTION INTRAMUSCULAR; INTRAVENOUS at 13:08

## 2020-07-13 RX ADMIN — TRANEXAMIC ACID 1000 MG: 1 INJECTION, SOLUTION INTRAVENOUS at 15:52

## 2020-07-13 RX ADMIN — MIDAZOLAM HYDROCHLORIDE 0.5 MG: 1 INJECTION, SOLUTION INTRAMUSCULAR; INTRAVENOUS at 12:03

## 2020-07-13 RX ADMIN — GABAPENTIN 600 MG: 300 CAPSULE ORAL at 11:11

## 2020-07-13 ASSESSMENT — PULMONARY FUNCTION TESTS
PIF_VALUE: 0
PIF_VALUE: 0
PIF_VALUE: 1
PIF_VALUE: 0
PIF_VALUE: 1
PIF_VALUE: 0
PIF_VALUE: 0
PIF_VALUE: 1
PIF_VALUE: 0
PIF_VALUE: 1
PIF_VALUE: 0
PIF_VALUE: 1
PIF_VALUE: 0
PIF_VALUE: 0
PIF_VALUE: 1
PIF_VALUE: 0
PIF_VALUE: 1
PIF_VALUE: 0
PIF_VALUE: 1
PIF_VALUE: 0
PIF_VALUE: 1
PIF_VALUE: 0
PIF_VALUE: 1
PIF_VALUE: 0
PIF_VALUE: 1
PIF_VALUE: 0
PIF_VALUE: 1
PIF_VALUE: 1
PIF_VALUE: 0
PIF_VALUE: 1
PIF_VALUE: 1
PIF_VALUE: 0
PIF_VALUE: 1
PIF_VALUE: 0
PIF_VALUE: 1
PIF_VALUE: 0
PIF_VALUE: 1
PIF_VALUE: 0
PIF_VALUE: 1
PIF_VALUE: 0
PIF_VALUE: 1
PIF_VALUE: 0
PIF_VALUE: 1
PIF_VALUE: 0
PIF_VALUE: 1
PIF_VALUE: 0
PIF_VALUE: 1
PIF_VALUE: 0
PIF_VALUE: 1
PIF_VALUE: 0
PIF_VALUE: 0
PIF_VALUE: 1
PIF_VALUE: 0
PIF_VALUE: 1
PIF_VALUE: 1
PIF_VALUE: 0
PIF_VALUE: 0
PIF_VALUE: 1
PIF_VALUE: 0
PIF_VALUE: 0
PIF_VALUE: 1
PIF_VALUE: 0
PIF_VALUE: 1
PIF_VALUE: 1
PIF_VALUE: 0
PIF_VALUE: 1
PIF_VALUE: 1
PIF_VALUE: 0
PIF_VALUE: 0
PIF_VALUE: 1
PIF_VALUE: 0
PIF_VALUE: 1
PIF_VALUE: 0
PIF_VALUE: 0
PIF_VALUE: 1
PIF_VALUE: 0
PIF_VALUE: 0
PIF_VALUE: 1
PIF_VALUE: 0
PIF_VALUE: 0
PIF_VALUE: 1
PIF_VALUE: 0
PIF_VALUE: 1
PIF_VALUE: 0
PIF_VALUE: 0

## 2020-07-13 ASSESSMENT — PAIN DESCRIPTION - PAIN TYPE
TYPE: SURGICAL PAIN
TYPE: SURGICAL PAIN

## 2020-07-13 ASSESSMENT — PAIN DESCRIPTION - FREQUENCY
FREQUENCY: CONTINUOUS
FREQUENCY: CONTINUOUS

## 2020-07-13 ASSESSMENT — PAIN DESCRIPTION - ORIENTATION
ORIENTATION: LEFT
ORIENTATION: LEFT

## 2020-07-13 ASSESSMENT — PAIN DESCRIPTION - LOCATION
LOCATION: KNEE
LOCATION: KNEE

## 2020-07-13 ASSESSMENT — PAIN SCALES - GENERAL
PAINLEVEL_OUTOF10: 4
PAINLEVEL_OUTOF10: 2
PAINLEVEL_OUTOF10: 3
PAINLEVEL_OUTOF10: 4
PAINLEVEL_OUTOF10: 2

## 2020-07-13 ASSESSMENT — PAIN DESCRIPTION - PROGRESSION
CLINICAL_PROGRESSION: GRADUALLY WORSENING
CLINICAL_PROGRESSION: GRADUALLY WORSENING

## 2020-07-13 ASSESSMENT — LIFESTYLE VARIABLES: SMOKING_STATUS: 0

## 2020-07-13 ASSESSMENT — PAIN - FUNCTIONAL ASSESSMENT
PAIN_FUNCTIONAL_ASSESSMENT: ACTIVITIES ARE NOT PREVENTED
PAIN_FUNCTIONAL_ASSESSMENT: 0-10

## 2020-07-13 ASSESSMENT — PAIN DESCRIPTION - DESCRIPTORS
DESCRIPTORS: ACHING;CONSTANT;DISCOMFORT
DESCRIPTORS: ACHING;DISCOMFORT

## 2020-07-13 ASSESSMENT — PAIN DESCRIPTION - ONSET: ONSET: ON-GOING

## 2020-07-13 NOTE — PROGRESS NOTES
Physical Therapy    Facility/Department: [facility] SURGERY  Initial Assessment    NAME: Isa Kayser  : 1955  MRN: [id number]    Date of Service: 2020       REQUIRES PT FOLLOW UP: Yes       Patient Diagnosis(es): The encounter diagnosis was Status post total left knee replacement. has a past medical history of Arthritis, Hypertension, and PONV (postoperative nausea and vomiting). has a past surgical history that includes Knee arthroscopy (Left, ); shoulder surgery (Left, ); Facial Fracture Surgery; and [place] tooth extraction. Evaluating Therapist: Venkat Hamlin PT     Referring Provider:  Dr. Muir Show #: [id number]   DIAGNOSIS:  OA s/p L TKA 2020    PRECAUTIONS: falls, FWBAT     Social:  Pt lives with s.o. in a  2  floor plan   steps and no  rails to enter. Prior to admission pt walked with  No AD. Has cane, ww      Initial Evaluation  Date:  2020 Treatment      Short Term/ Long Term   Goals   Was pt agreeable to Eval/treatment? yes      Does pt have pain? 2/10 L knee pain      Bed Mobility  Rolling:  NT   Supine to sit:  Min assist   Sit to supine:  NT   Scooting: min assist in sit    SBA    Transfers Sit to stand:  Min assist   Stand to sit: min assist   Stand pivot: min assist   SBA    Ambulation    15 feet and 50 feet x 1with ww  with min assist    200 feet with  ww with  SBA        Stair negotiation: ascended and descended NT    4  steps with no  rail with AAD CGA    LE ROM  AAROM L knee 5-90 degrees     LE strength  3+ to 4-/ 5 L knee in available range   4/ 5 L knee    AM- PAC RAW score  17/ 24            Pt is alert and Oriented x  3      Balance: min assist   Sensation:  Reports decreased sensation due to nerve block, private area  Bed/Chair alarm: no     ASSESSMENT  Pt displays functional ability as noted in the objective portion of this evaluation. Treatment/Education:    Mobility as above.  Pulse ox 92% on Ra after mobility     Pt educated on fall risk,  Safety and proper technique with all mobility, ww safety, LE exercises        Patient response to education:   Pt verbalized understanding Pt demonstrated skill Pt requires further education in this area   x With cues   x       Comments:  Pt left  In chair after session, with call light in reach. Rehab potential is Good for reaching above PT goals. Pts/ family goals   1. Home     Patient and or family understand(s) diagnosis, prognosis, and plan of care. - yes     PLAN  PT care will be provided in accordance with the objectives noted above. Whenever appropriate, clear delegation orders will be provided for nursing staff. Exercises and functional mobility practice will be used as well as appropriate assistive devices or modalities to obtain goals. Patient and family education will also be administered as needed. Frequency of treatments will be BID x 3 days. Time in:  1515   Time out:  1535       Evaluation Time includes thorough review of current medical information, gathering information on past medical history/social history and prior level of function, completion of standardized testing/informal observation of tasks, assessment of data and education on plan of care and goals.     CPT codes:  [x] Low Complexity PT evaluation 28310  [] Moderate Complexity PT evaluation 64883  [] High Complexity PT evaluation 69059  [] PT Re-evaluation 52312  [] Gait training 73877  minutes  [x] Therapeutic activities 56867 10 minutes  [] Therapeutic exercises 44614  minutes  [] Neuromuscular reeducation 81107  minutes       Arely Carter number:  PT [id number]

## 2020-07-13 NOTE — ANESTHESIA PROCEDURE NOTES
Spinal Block    Patient location during procedure: OR  Start time: 7/13/2020 12:55 PM  End time: 7/13/2020 1:04 PM  Reason for block: primary anesthetic  Staffing  Anesthesiologist: Rhea Ramirez MD  Resident/CRNA: DANIELITO Pina - YOSEF  Other anesthesia staff: Eb Dodd RN  Performed: other anesthesia staff and anesthesiologist   Preanesthetic Checklist  Completed: patient identified, site marked, surgical consent, pre-op evaluation, timeout performed, IV checked, risks and benefits discussed, monitors and equipment checked, anesthesia consent given, oxygen available and patient being monitored  Spinal Block  Patient position: sitting  Prep: ChloraPrep and site prepped and draped  Patient monitoring: cardiac monitor, continuous pulse ox, frequent blood pressure checks and continuous capnometry  Approach: midline  Location: L3/L4  Procedures: paresthesia technique  Provider prep: mask, sterile gloves and sterile gown  Local infiltration: lidocaine  Agent: bupivacaine  Dose: 2  Dose: 2  Needle  Needle type: Quincke   Needle gauge: 22 G  Needle length: 3.5 in  Needle insertion depth: 3 cm  Assessment  Sensory level: T8  Events: cerebrospinal fluid  Swirl obtained: Yes  CSF: clear  Attempts: 1  Hemodynamics: stable

## 2020-07-13 NOTE — PROGRESS NOTES
Occupational Therapy  OCCUPATIONAL THERAPY INITIAL EVALUATION      Date:2020  Patient Name: Kumar James  MRN: [id number]  : 1955  Room: [address] [place] [address]    Referring Provider: Alicia Mccallum MD  Evaluating OT: Jeremy Connell Rosa Dowling - [id number]    AM-PAC Daily Activity Raw Score: 17      Recommended Adaptive Equipment: Continue to assess. Diagnosis: Status post total left knee replacement [Z96.652]   Surgery: Patient underwent L TKA on 2020. Pertinent Medical History: arthritis, HTN      Precautions: falls, FWBAT    Home Living: Patient lives with his significant other in a two-floor home (2+2 steps to enter - no handrail); patient's bedroom is on the second floor (no handrail on any steps within patient's home). Patient noted that he will not have to access the basement initially upon his return home. Bathroom Setup: tub shower (no seat, no grab bar)    Prior Level of Function (PLOF): Per patient, he was independent with ADLs, independent with IADLs, and independent with functional mobility prior to this hospitalization. Driving: Yes    Pain Level: Patient reported experiencing pain in his L knee, which he described as a \"dull ache\" and rated 2 out of 10. Cognition: Patient alert and oriented x3. WFL command follow demonstrated. Patient pleasant, cooperative, and motivated to return to PLOF. Memory: WFL   Sequencing: WFL   Problem Solving: WFL   Judgement/Safety: WFL grossly    Functional Assessment:   Initial Eval Status  Date: 2020 Treatment Status  Date:  Short Term Goals  Treatment Frequency/Duration: 2-5x/week for 3-7 days PRN   Feeding Independent     Grooming Min A  Independent  (standing at sink)   UB Dressing SBA  Independent   LB Dressing Mod A to thread bilateral feet into disposable brief; Mod A to pull up and arrange brief while standing with walker.   Mod I / Independent - with use of AE, as needed/appropriate   Bathing Mod A  Mod I / Independent - with use of AE/DME, as needed/appropriate   Toileting Min A with toilet transfer (BSC frame over toilet); cues given occasionally to maximize safety. Mod I / Indeendent   Bed Mobility  Supine-to-Sit: SBA   Independent   Functional Transfers Sit-to-Stand: Min A   from EOB  Independent   Functional Mobility Min A   (with walker) within patient's room and bathroom  Mod I - with use of device, as needed/appropriate   Balance Sitting: Good  (at EOB)  Standing: Fair-  (with walker)  Fair+ dynamic standing balance during completion of ADLs and other functional tasks. Activity Tolerance Fair  Patient will demonstrate Good understanding and consistent implementation of energy conservation techniques and work simplification techniques into ADL/IADL routines. Visual/  Perceptual WFL  Patient wears glasses. N/A     Long-Term Goal: Patient will increase functional independence to PLOF in order to allow patient to live in least restrictive environment. Strength: ROM: Additional Information:    R UE  WFL  WFL    L UE WFL  WFL      Hearing: WFL  Sensation: Patient denied experiencing numbness/tingling in B UEs. Tone: WFL  Edema: No    Comments: RN approved patient's participation in [address] [place] [address] activities. Upon arrival, patient supine in bed. At end of session, patient seated in bedside chair with call light and phone within reach, patient's significant other present, and all lines and tubes intact. Patient would benefit from continued skilled OT to increase safety and independence with completion of ADL/IADL tasks for functional independence and quality of life. Treatment: Patient education provided regardin) safe transfer techniques, 2) importance of having assistance with ADLs and other OOB activities to prevent falls during hospitalization, 3) safe transfer techniques, 4) techniques to maximize safety with use of walker. Patient verbalized understanding.  Further skilled OT treatment indicated to increase patient's safety and independence with completion of ADL/IADL tasks in order to maximize patient's functional independence and quality of life. Eval Complexity: Low    Assessment of Current Deficits:   Functional Mobility [x]  ADLs [x] Strength [x]  Cognition []  Functional Transfers  [x] IADLs [x] Safety Awareness [x]  Endurance [x]  Fine Motor Coordination [] Balance [x] Vision/Perception [] Sensation []   Gross Motor Coordination [] ROM [] Delirium []                  Motor Control []    Plan of Care:   OT treatment to be provided 2-5x/week for 3-7 days to address the following, as needed, during hospitalization:  ADL Retraining [x]   Equipment Needs [x]   Neuromuscular Re-Education [x] Energy Conservation Techniques [x]  Functional Transfer Training [x] Patient and/or Family Education [x]  Functional Mobility Training [x] Environmental Modifications [x]  Cognitive Re-Training []   Compensatory Techniques for ADLs [x]  Splinting Needs []   Positioning to Improve Overall Function [x]   Therapeutic Activity [x]  Therapeutic Exercise  [x]  Visual/Perceptual: []    Delirium Prevention/Treatment  []  Other:  []    Rehab Potential: Good for established goals. Patient / Family Goal: Patient indicated that he anticipates returning home upon discharge. Patient and/or family were instructed on functional diagnosis, prognosis/goals, and OT plan of care. Demonstrated Good understanding. Low complexity evaluation + 10 timed treatment minutes  Treatment Time In: 1720  Treatment Time Out: 1730    Treatment Charges: Minutes: Units:    Ther Ex  02613     Manual Therapy Mehdi Conner [id number]     ADL/Home Mgt 52580 10 1   Neuro Re-ed 48667     Group Therapy      Orthotic manage/training  41599     Total Timed Treatment 10 1     Evaluation time includes thorough review of current medical information, gathering information on past medical history/social history and prior level of function, completion of standardized testing/informal observation of tasks, assessment of data, and development of POC/Goals. Laisha Loyola, OTR/L  License Number: [id number]

## 2020-07-13 NOTE — OP NOTE
Operative Note      Patient: Klarissa Lock  YOB: 1955  MRN: [id number]    Date of Procedure: 7/13/2020    Pre-Op Diagnosis: OSTEOARTHRITIS LEFT KNEE    Post-Op Diagnosis: Same       Procedure(s):  LEFT KNEE TOTAL ARTHROPLASTY (Abrahan Rosmery)    Surgeon(s):  Brett Norman MD    Assistant:    Assistant: Perlita Villavicencio  Resident: Esa Dozier DO    Anesthesia: Spinal    Estimated Blood Loss (mL): less than 845     Complications: None    Specimens:   ID Type Source Tests Collected by Time Destination   A : left femoral, tibial and patella bone, knee bone Bone Bone SURGICAL PATHOLOGY (Canceled) Brett Norman MD 7/13/2020 1438        Implants:  Implant Name Type Inv. Item Serial No.  Lot No. LRB No. Used Action   CEMENT BARIUM RADIOPQ FULL 40GR Cement CEMENT BARIUM RADIOPQ FULL 40GR  BR[employer]: [employer] [id number] Left 1 Implanted   total femoral implant    [employer] [id number] Left 1 Implanted   total tibial tray    [employer] [id number] Left 1 Implanted   IMPL KNEE PATELLA ITOTAL IPOLY 38X8. 5MM Knee IMPL KNEE PATELLA ITOTAL IPOLY 38X8. 5MM  [employer] [id number] Left 1 Implanted   7mm medial insert    [employer] [id number] Left 1 Implanted   IMPL KNEE CR POLY LATERAL A INSERT KIT L Knee IMPL KNEE CR POLY LATERAL A INSERT KIT L  [employer] [id number] Left 1 Implanted         Drains: * No LDAs found *    Findings: End-stage medial and patellofemoral cartilage loss with eburnation deformity and osteophyte formation.        OPERATIVE INDICATIONS: 61-year-old male with end-stage varus DJD of the left knee, disabling pain from ADLs unresponsive to medical or physical or injection therapies.     Detailed Description of Procedure: Patient was brought to the operating suite, administered balanced spinal anesthesia by the anesthesia service.  They were then placed in the supine position and  tourniquet was placed high on the thigh, the left knee and leg were prepped and draped in usual lavage and dried, polymethylmethacrylate was mixed and then at the appropriate stage applied to the patient's specific tibial baseplate component which was now press fit into place. Cement was trimmed from all margins. The patient specific femoral component was now press fit with PMMA onto the distal femur and the knee was placed in extension to compress the cement which was again trimmed from all margins. With the knee in flexion the separate medial and lateral trial patient specific polyethylene components were snapped into place, each fitting well. With the knee in extension and all polyethylene patellar component was cemented into place, excess cement was trimmed. Examination again showed neutral clinical alignment, excellent tissue balance through full range of motion and satisfactory patellar tracking.     The knee was irrigated and then dried, periarticular soft tissues were injected with Toradol and Sensorcaine, platelet rich activated platelet gel was now sprayed into the depths of the wound around the margins of the implant. Arthrotomy was closed with interrupted and running #1 Ethibond suture. Following capsular closure the knee itself was injected with remaining Toradol and Sensorcaine. Platelet poor APG was now sprayed into the subcutaneous tissues which were then closed with interrupted 0 Vicryl, skin was closed with running 3-0 Monocryl and Steri-Strips and a dry sterile dressing was applied and the tourniquet was released.  The patient was taken to PACU in stable condition having tolerated the procedure well.               Electronically signed by Yair Cosme MD on 7/13/2020 at 6:41 PM

## 2020-07-13 NOTE — ANESTHESIA PRE PROCEDURE
Department of Anesthesiology  Preprocedure Note       Name:  Isa Kayser   Age:  60 y.o.  :  1955                                          MRN:  [id number]         Date:  2020      Surgeon: Merry Rojas):  Bree Juarez MD    Procedure: Procedure(s):  LEFT KNEE TOTAL ARTHROPLASTY (CONFORM[employer])++ADDUCTOR NERVE BLOCK+++    Medications prior to admission:   Prior to Admission medications    Medication Sig Start Date End Date Taking?  Authorizing Provider   loratadine (CLARITIN) 10 MG capsule Take 10 mg by mouth as needed   Yes Historical Provider, MD   amLODIPine (NORVASC) 10 MG tablet Take 10 mg by mouth daily Instructed to take am of procedure 1/15/20  Yes Historical Provider, MD   bisoprolol (ZEBETA) 5 MG tablet Take 5 mg by mouth daily Instructed to take am of procedure 1/15/20  Yes Historical Provider, MD   losartan-hydrochlorothiazide (HYZAAR) 100-12.5 MG per tablet Take 1 tablet by mouth daily  1/15/20  Yes Historical Provider, MD   Omega-3 Fatty Acids (FISH OIL) 1200 MG CAPS Take 1 capsule by mouth 2 times daily Ld 2020   Yes Historical Provider, MD   Glucosamine-Chondroit-Vit C-Mn (GLUCOSAMINE CHONDR 1500 COMPLX PO) Take 1 capsule by mouth 2 times daily Ld 2020   Yes Historical Provider, MD   l-arginine 500 MG capsule Take 500 mg by mouth 2 times daily Ld 2020   Yes Historical Provider, MD   Cholecalciferol (VITAMIN D3 PO) Take 1 capsule by mouth 2 times daily Ld 2020   Yes Historical Provider, MD   Multiple Vitamins-Minerals (CENTRUM ADULTS PO) Take 1 capsule by mouth daily Ld 2020   Yes Historical Provider, MD       Current medications:    Current Facility-Administered Medications   Medication Dose Route Frequency Provider Last Rate Last Dose    0.9 % sodium chloride infusion   Intravenous Continuous Bree Juarez MD        acetaminophen (TYLENOL) tablet 1,000 mg  1,000 mg Oral Once Bree Juarez MD        ceFAZolin (ANCEF) 3 g in dextrose 5 % 100 mL IVPB  3 g Intravenous On Call to MD Josey        celecoxib (CELEBREX) capsule 200 mg  200 mg Oral Once Brett Norman MD        dexamethasone (PF) (DECADRON) injection 8 mg  8 mg Intravenous Once Brett Norman MD        gabapentin (NEURONTIN) capsule 600 mg  600 mg Oral Once Brett Norman MD        sodium chloride flush 0.9 % injection 10 mL  10 mL Intravenous 2 times per day Brett Norman MD        sodium chloride flush 0.9 % injection 10 mL  10 mL Intravenous PRN Brett Norman MD        tranexamic acid (CYKLOKAPRON) 1,000 mg in dextrose 5 % 100 mL IVPB  1,000 mg Intravenous On Call to MD Josey        fentaNYL (SUBLIMAZE) injection 50 mcg  50 mcg Intravenous PRN Jacky Orta MD        midazolam (VERSED) injection 1 mg  1 mg Intravenous PRN Jacky Orta MD        oxyCODONE (OXYCONTIN) extended release tablet 10 mg  10 mg Oral Once Jacky Orta MD        ropivacaine (NAROPIN) 0.5% injection 30 mL  30 mL Infiltration Once Jacky Orta MD           Allergies: Allergies   Allergen Reactions    Neosporin [Neomycin-Polymyxin-Gramicidin] Rash       Problem List:  There is no problem list on file for this patient. Past Medical History:        Diagnosis Date    Arthritis     Hypertension     PONV (postoperative nausea and vomiting)        Past Surgical History:        Procedure Laterality Date    FACIAL FRACTURE SURGERY      [facility]illa    KNEE ARTHROSCOPY Left 2013    SHOULDER SURGERY Left 1981    WISDOM TOOTH EXTRACTION         Social History:    Social History     Tobacco Use    Smoking status: Never Smoker    Smokeless tobacco: Never Used   Substance Use Topics    Alcohol use:  Yes     Alcohol/week: 4.0 standard drinks     Types: 4 Cans of beer per week     Comment: Socially                                Counseling given: Not Answered      Vital Signs (Current):   Vitals:    07/09/20 1238 07/13/20 1032   BP:  (!) 168/87   Pulse:  61   Resp: 20   Temp:  96.8 °F (36 °C)   TempSrc:  Temporal   SpO2:  96%   Weight: 260 lb (117.9 kg) 260 lb (117.9 kg)   Height: 5' 10\" (1.778 m) 5' 10\" (1.778 m)                                              BP Readings from Last 3 Encounters:   07/13/20 (!) 168/87   06/01/20 139/66   03/19/20 136/84       NPO Status:                                                                                 BMI:   Wt Readings from Last 3 Encounters:   07/13/20 260 lb (117.9 kg)   06/01/20 268 lb (121.6 kg)   03/19/20 265 lb (120.2 kg)     Body mass index is 37.31 kg/m². CBC: No results found for: WBC, RBC, HGB, HCT, MCV, RDW, PLT    CMP: No results found for: NA, K, CL, CO2, BUN, CREATININE, GFRAA, AGRATIO, LABGLOM, GLUCOSE, PROT, CALCIUM, BILITOT, ALKPHOS, AST, ALT    POC Tests: No results for input(s): POCGLU, POCNA, POCK, POCCL, POCBUN, POCHEMO, POCHCT in the last 72 hours. Coags: No results found for: PROTIME, INR, APTT    HCG (If Applicable): No results found for: PREGTESTUR, PREGSERUM, HCG, HCGQUANT     ABGs: No results found for: PHART, PO2ART, EUQ1GNH, IAX3NWT, BEART, Q1LJRKTR     Type & Screen (If Applicable):  No results found for: LABABO, LABRH    Drug/Infectious Status (If Applicable):  No results found for: HIV, HEPCAB    COVID-19 Screening (If Applicable): No results found for: COVID19      Anesthesia Evaluation  Patient summary reviewed and Nursing notes reviewed   history of anesthetic complications: PONV.   Airway: Mallampati: II  TM distance: >3 FB   Neck ROM: full  Mouth opening: > = 3 FB Dental:    (+) caps      Pulmonary:Negative Pulmonary ROS breath sounds clear to auscultation      (-) not a current smoker                           Cardiovascular:  Exercise tolerance: good (>4 METS),   (+) hypertension:,         Rhythm: regular  Rate: normal           Beta Blocker:  Dose within 24 Hrs         Neuro/Psych:   Negative Neuro/Psych ROS              GI/Hepatic/Renal:   (+) GERD: well controlled, Endo/Other:    (+) : arthritis: OA., .                 Abdominal:           Vascular: negative vascular ROS. Anesthesia Plan      spinal     ASA 3     (Prefers spinal & PNB.)        Anesthetic plan and risks discussed with patient and spouse. Denton Koyanagi, MD   7/13/2020        PNB Consent  Benefits, alternatives and risks of PNBs were discussed with patient. Risks include but are not limited to; bleeding, LAST, infection and possible nerve trauma. All questions answered. PNB was recommended and encouraged as part of multi-modal pain therapy. After consideration;    patient agrees to:   left        Adductor Canal Block. for management of post-op pain.       Denton Koyanagi, MD  Staff Anesthesiologist  July 13, 2020  10:54 AM

## 2020-07-13 NOTE — CONSULTS
[facility]   Consult for Medical Management      Reason for Consult: Medical management hypertension    History of Present Illness:    27-year-old male with history of hypertension and osteoarthritis presented for elective left total knee arthroplasty after failing outpatient treatment with NSAIDs and physical therapy, patient tolerated procedure well, worked with PT OT, denied any nausea no chest pain or shortness of breath no abdominal pain no diarrhea no constipation, pain is controlled with pain medicine. Patient reported that his penis is still numb and that he has not voided since the surgery but does not feel full yet. Informant(s) for H&P: patient    REVIEW OF SYSTEMS:  A comprehensive review of systems was negative except for: what is in the HPI      PMH:  Past Medical History:   Diagnosis Date    Arthritis     Hypertension     PONV (postoperative nausea and vomiting)        Surgical History:  Past Surgical History:   Procedure Laterality Date    FACIAL FRACTURE SURGERY      Maxilla    KNEE ARTHROSCOPY Left 2013    SHOULDER SURGERY Left 1981    WISDOM TOOTH EXTRACTION         Medications Prior to Admission:    Prior to Admission medications    Medication Sig Start Date End Date Taking? Authorizing Provider   aspirin EC 81 MG EC tablet Take 1 tablet by mouth 2 times daily for 28 days 7/13/20 8/10/20 Yes Immanuel Jacob DO   oxyCODONE-acetaminophen (PERCOCET) 5-325 MG per tablet Take 1 tablet by mouth every 6 hours as needed for Pain for up to 7 days. Intended supply: 7 days.  Take lowest dose possible to manage pain 7/13/20 7/20/20 Yes Immanuel Jacob DO   loratadine (CLARITIN) 10 MG capsule Take 10 mg by mouth as needed   Yes Historical Provider, MD   amLODIPine (NORVASC) 10 MG tablet Take 10 mg by mouth daily Instructed to take am of procedure 1/15/20  Yes Historical Provider, MD   bisoprolol (ZEBETA) 5 MG tablet Take 5 mg by mouth daily Instructed to take am of procedure 1/15/20  Yes Historical Provider, MD   losartan-hydrochlorothiazide (HYZAAR) 100-12.5 MG per tablet Take 1 tablet by mouth daily  1/15/20  Yes Historical Provider, MD   Omega-3 Fatty Acids (FISH OIL) 1200 MG CAPS Take 1 capsule by mouth 2 times daily Ld 7/8/2020   Yes Historical Provider, MD   Glucosamine-Chondroit-Vit C-Mn (GLUCOSAMINE CHONDR 1500 COMPLX PO) Take 1 capsule by mouth 2 times daily Ld 7/8/2020   Yes Historical Provider, MD   l-arginine 500 MG capsule Take 500 mg by mouth 2 times daily Ld 7/8/2020   Yes Historical Provider, MD   Cholecalciferol (VITAMIN D3 PO) Take 1 capsule by mouth 2 times daily Ld 7/8/2020   Yes Historical Provider, MD   Multiple Vitamins-Minerals (CENTRUM ADULTS PO) Take 1 capsule by mouth daily Ld 7/8/2020   Yes Historical Provider, MD       Allergies:    Neosporin [neomycin-polymyxin-gramicidin]    Social History:    reports that he has never smoked. He has never used smokeless tobacco. He reports current alcohol use of about 4.0 standard drinks of alcohol per week. He reports current drug use. Drug: Marijuana. Family History:   family history includes Heart Disease in his brother and father; Hypertension in his brother and father.       PHYSICAL EXAM:  Vitals:  /73   Pulse 62   Temp 96.6 °F (35.9 °C)   Resp 16   Ht 5' 10\" (1.778 m)   Wt 260 lb (117.9 kg)   SpO2 93%   BMI 37.31 kg/m²     General Appearance: alert and oriented to person, place and time and in no acute distress  Skin: warm and dry  Head: normocephalic and atraumatic  Eyes: pupils equal, round, and reactive to light, extraocular eye movements intact, conjunctivae normal  Neck: neck supple and non tender without mass   Pulmonary/Chest: clear to auscultation bilaterally- no wheezes, rales or rhonchi, normal air movement, no respiratory distress  Cardiovascular: normal rate, normal S1 and S2 and no carotid bruits  Abdomen: soft, non-tender, non-distended, normal bowel sounds, no masses or organomegaly  Extremities: no cyanosis, no clubbing and left knee in dressing, +1 edema bilateral lower ext. Neurologic: no cranial nerve deficit and speech normal      LABS:  No results for input(s): NA, K, CL, CO2, BUN, CREATININE, GLUCOSE, CALCIUM in the last 72 hours. No results for input(s): WBC, RBC, HGB, HCT, MCV, MCH, MCHC, RDW, PLT, MPV in the last 72 hours. No results for input(s): POCGLU in the last 72 hours. Radiology:     No orders to display         ASSESSMENT:      Active Problems:    Status post total left knee replacement  Resolved Problems:    * No resolved hospital problems. *      PLAN:    1. Generative disease of left knee, status post left total knee arthroplasty, postop day 0, postop care with incentive spirometry, PT OT, pain management and DVT prophylaxis. 2.  History of hypertension, blood pressure is better controlled now, resume home amlodipine losartan and hydrochlorothiazide as well as Lopressor. 3.  History of bilateral lower extremity edema, patient claims that he was placed on hydrochlorothiazide for the edema, currently is running IV fluids, I will discontinue IV fluids if is okay with Ortho, consider starting patient on Lasix, patient is to have compression stockings on. Thank you very much for this interesting consult and we will continue to follow along. Feel free to call with any questions at [phone]. NOTE: This report was transcribed using voice recognition software. Every effort was made to ensure accuracy; however, inadvertent computerized transcription errors may be present.   Electronically signed by Manisha Tee MD on 7/13/2020 at 6:58 PM

## 2020-07-13 NOTE — ANESTHESIA PROCEDURE NOTES
Peripheral Block    Patient location during procedure: pre-op  Start time: 7/13/2020 12:01 PM  End time: 7/13/2020 12:10 PM  Staffing  Anesthesiologist: Jeff Cobb MD  Other anesthesia staff: Aby Chowdhury RN  Performed: anesthesiologist and other anesthesia staff   Preanesthetic Checklist  Completed: patient identified, site marked, surgical consent, pre-op evaluation, timeout performed, IV checked, risks and benefits discussed, monitors and equipment checked, anesthesia consent given, oxygen available and patient being monitored  Peripheral Block  Patient position: supine  Prep: ChloraPrep and site prepped and draped  Patient monitoring: cardiac monitor, continuous pulse ox, frequent blood pressure checks and IV access  Block type: Saphenous  Laterality: left  Injection technique: single-shot  Procedures: ultrasound guided  Local infiltration: lidocaine  Infiltration strength: 1 %  Dose: 5 mL  Provider prep: mask, sterile gloves and sterile gown  Local infiltration: lidocaine  Needle  Needle type: combined needle/nerve stimulator   Needle gauge: 20 G  Needle length: 10 cm  Needle localization: ultrasound guidance  Assessment  Injection assessment: negative aspiration for heme  Paresthesia pain: none  Slow fractionated injection: yes  Hemodynamics: stable  Medications Administered  Ropivacaine (NAROPIN) injection 0.5%, 30 mL  Reason for block: primary anesthetic and at surgeon's request

## 2020-07-14 LAB
HCT VFR BLD CALC: 44.3 % (ref 37–54)
HEMOGLOBIN: 15.2 G/DL (ref 12.5–16.5)

## 2020-07-14 PROCEDURE — G0378 HOSPITAL OBSERVATION PER HR: HCPCS

## 2020-07-14 PROCEDURE — 36415 COLL VENOUS BLD VENIPUNCTURE: CPT

## 2020-07-14 PROCEDURE — 97110 THERAPEUTIC EXERCISES: CPT

## 2020-07-14 PROCEDURE — 6370000000 HC RX 637 (ALT 250 FOR IP): Performed by: ORTHOPAEDIC SURGERY

## 2020-07-14 PROCEDURE — 1200000000 HC SEMI PRIVATE

## 2020-07-14 PROCEDURE — 97530 THERAPEUTIC ACTIVITIES: CPT

## 2020-07-14 PROCEDURE — 96374 THER/PROPH/DIAG INJ IV PUSH: CPT

## 2020-07-14 PROCEDURE — 2580000003 HC RX 258: Performed by: ORTHOPAEDIC SURGERY

## 2020-07-14 PROCEDURE — 97535 SELF CARE MNGMENT TRAINING: CPT

## 2020-07-14 PROCEDURE — 99233 SBSQ HOSP IP/OBS HIGH 50: CPT | Performed by: INTERNAL MEDICINE

## 2020-07-14 PROCEDURE — 85014 HEMATOCRIT: CPT

## 2020-07-14 PROCEDURE — 85018 HEMOGLOBIN: CPT

## 2020-07-14 PROCEDURE — 6360000002 HC RX W HCPCS: Performed by: ORTHOPAEDIC SURGERY

## 2020-07-14 PROCEDURE — 99024 POSTOP FOLLOW-UP VISIT: CPT | Performed by: ORTHOPAEDIC SURGERY

## 2020-07-14 RX ADMIN — METOPROLOL TARTRATE 25 MG: 25 TABLET, FILM COATED ORAL at 08:20

## 2020-07-14 RX ADMIN — OXYCODONE HYDROCHLORIDE 5 MG: 5 TABLET ORAL at 08:20

## 2020-07-14 RX ADMIN — DOCUSATE SODIUM 50 MG AND SENNOSIDES 8.6 MG 1 TABLET: 8.6; 5 TABLET, FILM COATED ORAL at 22:16

## 2020-07-14 RX ADMIN — METOPROLOL TARTRATE 25 MG: 25 TABLET, FILM COATED ORAL at 22:16

## 2020-07-14 RX ADMIN — DOCUSATE SODIUM 50 MG AND SENNOSIDES 8.6 MG 1 TABLET: 8.6; 5 TABLET, FILM COATED ORAL at 08:19

## 2020-07-14 RX ADMIN — ASPIRIN 325 MG: 325 TABLET, DELAYED RELEASE ORAL at 08:20

## 2020-07-14 RX ADMIN — ASPIRIN 325 MG: 325 TABLET, DELAYED RELEASE ORAL at 22:16

## 2020-07-14 RX ADMIN — Medication 2 G: at 05:34

## 2020-07-14 RX ADMIN — AMLODIPINE BESYLATE 10 MG: 10 TABLET ORAL at 08:20

## 2020-07-14 RX ADMIN — ACETAMINOPHEN 650 MG: 325 TABLET ORAL at 05:34

## 2020-07-14 RX ADMIN — OXYCODONE HYDROCHLORIDE 5 MG: 5 TABLET ORAL at 16:55

## 2020-07-14 RX ADMIN — ACETAMINOPHEN 650 MG: 325 TABLET ORAL at 16:55

## 2020-07-14 RX ADMIN — Medication 10 ML: at 08:20

## 2020-07-14 RX ADMIN — ACETAMINOPHEN 650 MG: 325 TABLET ORAL at 11:03

## 2020-07-14 RX ADMIN — HYDROCHLOROTHIAZIDE 12.5 MG: 12.5 TABLET ORAL at 08:20

## 2020-07-14 RX ADMIN — ACETAMINOPHEN 650 MG: 325 TABLET ORAL at 22:15

## 2020-07-14 RX ADMIN — Medication 10 ML: at 22:17

## 2020-07-14 RX ADMIN — LOSARTAN POTASSIUM 100 MG: 50 TABLET ORAL at 08:19

## 2020-07-14 ASSESSMENT — PAIN SCALES - GENERAL
PAINLEVEL_OUTOF10: 2
PAINLEVEL_OUTOF10: 5
PAINLEVEL_OUTOF10: 1
PAINLEVEL_OUTOF10: 2
PAINLEVEL_OUTOF10: 1
PAINLEVEL_OUTOF10: 0
PAINLEVEL_OUTOF10: 5

## 2020-07-14 NOTE — PROGRESS NOTES
NURSES NOTE [facility]  POD # 1 LEFT KNEE TOTAL JOINT ARTHROPLASTY - CONFORMIS    Subjective: \"I feel great, no pain at all. \"                                                                                                                                           Objective/Assessment: patient resting quietly in bed     Visited patient with Dr. Janine Damon  who examined patient and discussed discharge plans with patient    Neuro:  Alert and oriented, pleasant and conversant   Appearance:  No distress   Pain Control:  Effective with prescribed pain relievers   Breathing:  Respirations - regular rate and rhythm  Saline haja:  Maintained   Appetite:  Restored   Voiding   In BR without difficulty, qs   Abdomen:  Soft   Expelling Flatus:  No   BM:  No   Dressing:  Aquacel maintained, C/D/I  Motion:  Patient able to straight leg raise, plantar and dorsi flex left foot to command without difficulty. Patient ambulated 15 then 50 feet with WW with PT POD 0, Min Assist  Compartments:  Thigh / calf soft and non tender   Neuro / Circulatory:intact          BP (!) 174/81   Pulse 71   Temp 97.4 °F (36.3 °C) (Oral)   Resp 16   Ht 5' 10\" (1.778 m)   Wt 260 lb (117.9 kg)   SpO2 94%   BMI 37.31 kg/m²     Recent Labs     07/14/20  0325   HGB 15.2   HCT 44.3   Pre  Op 6/29/2020:  Hgb. 15.3  Hct. 45.6 Blood Work completed at [facility] in [place], [place]  EBL in [place] <100 ml. No significant blood loss    Plan:   Continue PT today  Discharge home tomorrow (patient lives 3 hours away in [place], nerve block has not yet worn off)) with Home Physical Therapy 3 times a week until seen in office on July 23 rd.   Home Physical Therapy is needed due to:  1.) osteoarthritis knee, 2.) fresh post op LEFT KNEE TOTAL JOINT ARTHROPLASTY, 3.)  post operative weakness and / or pain, 4.) for balance,gait and transfer, 5.) unable to leave home without maximum effort and requires assistance of wheelchair or walker  [facility]: evaluate and treat for strength training, gait training, balance training, safety training, functional mobility, home assessment due to fall risk. DME: Wheeled walker needed for limited mobility d/t surgery - LEFT KNEE TOTAL JOINT ARTHROPLASTY. Discharge instructions discussed with patient  Follow Patient Education Guide: Iron Replacement given to patient pre op. Patient instructed to continue eating foods rich in iron and vitamin C for one month post op   To prevent constipation while taking opioid pain relievers; patient instructed to drink plenty of water, eat fruits and vegetables, increase the fiber in his diet, drink prune, plum or apple juice, eat dried fruit such as prunes. May take OTC aids: Stool softeners, suppositories, Fleet enema. If constipation persists: Contact PCP    Remove Aquacel dressing on Monday, July 20 th  Patient expresses understanding and is in agreement with the plan  Prescription for pain reliever - Percocet 5/325 (28 tablets / 0 refills) take one tablet every 6 hours as needed for pain up to 7 days  DVT Prophylaxis: Continue lower extremity circulation exercises when at rest, frequent ambulation, (SUDHIR hose if ordered by the surgeon) and medication - one adult enteric coated 325 mg aspirin tablet twice a day  Patient will call office for any questions, concerns or problems [phone], ext. [phone]  Follow up with Dr. Liz Bucio. Nelson on Thursday, July 23 rd at 2:45 pm for x-ray, exam by the surgeon and a prescription for Out Patient Physical Therapy      Ana Alvarenga RN, BSN, ONC, scribe for Dr. Liz Bucio.  Nelson KRAFT - Orthopedic Surgeon  7/14/2020 8:29 AM

## 2020-07-14 NOTE — PROGRESS NOTES
Physical Therapy  Facility/Department: [address] ORTHO SURGERY  Daily Treatment Note  NAME: Ebenezer Rae  : 1955  MRN: [id number]    Date of Service: 2020          Patient Diagnosis(es): The encounter diagnosis was Status post total left knee replacement. has a past medical history of Arthritis, Hypertension, and PONV (postoperative nausea and vomiting). has a past surgical history that includes Knee arthroscopy (Left, ); shoulder surgery (Left, ); Facial Fracture Surgery; and [place] tooth extraction. Evaluating Therapist: Yonis Nuñez, PT      Referring Provider:  Dr. Shiraz Diane #: [phone]   DIAGNOSIS:  OA s/p L TKA 2020     PRECAUTIONS: falls, FWBAT      Social:  Pt lives with s.o. in a  2  floor plan   steps and no  rails to enter. Prior to admission pt walked with  No AD. Has cane, ww        Initial Evaluation  Date:  2020 Treatment     2020 AM Short Term/ Long Term   Goals   Was pt agreeable to Eval/treatment? yes   yes      Does pt have pain?   2/10 L knee pain   minimal knee pain, increased after mobility      Bed Mobility  Rolling:  NT   Supine to sit:  Min assist   Sit to supine:  NT   Scooting: min assist in sit    NT  Pt in chair upon arrival  SBA    Transfers Sit to stand:  Min assist   Stand to sit: min assist   Stand pivot: min assist  Sit <> stand : SBA   SBA    Ambulation    15 feet and 50 feet x 1with ww  with min assist   200, 300, 15 feet x 1 with ww SBA   200 feet with  ww with  SBA          Stair negotiation: ascended and descended NT   4 steps with B HRs SBA  4 steps with cane and either 1 HR or HHA CGA  4  steps with no  rail with AAD CGA    LE ROM  AAROM L knee 5-90 degrees       LE strength  3+ to 4-/ 5 L knee in available range    4/ 5 L knee    AM- PAC RAW score  17 24   18               Pt is alert and Oriented x  3       Balance: SBA   Sensation:  WFL  Bed/Chair alarm: no     ASSESSMENT    Pt displays functional ability as noted in the objective portion of this treatment             Treatment/Education:    Mobility as above. Cues for proper technique and safety with all mobility. Increased instruction with sequencing on stair negotiation and cane use on steps   Performed seated knee flex, LAQs, APs. Instruction on proper technique, AAROM as needed       Pt educated on fall risk,  Safety and proper technique with all mobility, ww safety, LE exercises         Patient response to education:   Pt verbalized understanding Pt demonstrated skill Pt requires further education in this area   x With cues   x         Comments:  Pt left  In chair after session, with call light in reach.            Pts/ family goals     1. Home      Patient and or family understand(s) diagnosis, prognosis, and plan of care. - yes      PLAN    PT care will be provided in accordance with the objectives noted above. Whenever appropriate, clear delegation orders will be provided for nursing staff. Exercises and functional mobility practice will be used as well as appropriate assistive devices or modalities to obtain goals. Patient and family education will also be administered as needed.     Frequency of treatments will be BID x 3 days. Con't with PT POC   Plan is for discharge tomorrow.      Time in:  1015   Time out:  1053            CPT codes:  []? Low Complexity PT evaluation [id number]  []? Moderate Complexity PT evaluation 99155  []? High Complexity PT evaluation [id number]  []? PT Re-evaluation [id number]  []? Gait training 37663  minutes  [x]? Therapeutic activities 71593 30 minutes  [x]? Therapeutic exercises 97126 8  minutes  []?  Neuromuscular reeducation 53674  minutes         Arely 18 number:  PT [phone]

## 2020-07-14 NOTE — ANESTHESIA POSTPROCEDURE EVALUATION
Department of Anesthesiology  Postprocedure Note    Patient: Fortino Root  MRN: [id number]  YOB: 1955  Date of evaluation: 7/14/2020  Time:  4:06 PM     Procedure Summary     Date:  07/13/20 Room / Location:  [facility] OR [address] [place] [address]    Anesthesia Start:  6065 Anesthesia Stop:  3953    Procedure:  LEFT KNEE TOTAL ARTHROPLASTY (Caryle Scripture) (Left Knee) Diagnosis:  (OSTEOARTHRITIS LEFT KNEE)    Surgeon:  Elvia Cabrales MD Responsible Provider:  Kyakay Brown MD    Anesthesia Type:  spinal ASA Status:  3          Anesthesia Type: No value filed. Aroldo Phase I: Aroldo Score: 10    Aroldo Phase II:      Last vitals: Reviewed and per EMR flowsheets.        Anesthesia Post Evaluation    Patient location during evaluation: PACU  Patient participation: complete - patient participated  Level of consciousness: awake and alert  Airway patency: patent  Nausea & Vomiting: no vomiting and no nausea  Complications: no  Cardiovascular status: blood pressure returned to baseline  Respiratory status: acceptable  Hydration status: euvolemic

## 2020-07-14 NOTE — PROGRESS NOTES
[facility] Progress Note    Admitting Date and Time: 7/13/2020  9:40 AM  Admit Dx: Status post total left knee replacement [Z96.652]    Subjective:  Patient is being followed for Status post total left knee replacement [Z96.652]   Pt feels ok, pain is manageable with pain meds    ROS: denies fever, chills, cp, sob, n/v, HA unless stated above.  amLODIPine  10 mg Oral Daily    metoprolol tartrate  25 mg Oral BID    sodium chloride flush  10 mL Intravenous 2 times per day    acetaminophen  650 mg Oral Q6H    sennosides-docusate sodium  1 tablet Oral BID    aspirin  325 mg Oral BID    losartan  100 mg Oral Daily    And    hydrochlorothiazide  12.5 mg Oral Daily     sodium chloride flush, 10 mL, PRN  morphine, 2 mg, Q3H PRN  magnesium hydroxide, 30 mL, Daily PRN  oxyCODONE, 5 mg, Q4H PRN         Objective:    BP (!) 174/81   Pulse 71   Temp 97.4 °F (36.3 °C) (Oral)   Resp 16   Ht 5' 10\" (1.778 m)   Wt 260 lb (117.9 kg)   SpO2 94%   BMI 37.31 kg/m²     General Appearance: alert and oriented to person, place and time and in no acute distress  Skin: warm and dry  Head: normocephalic and atraumatic  Eyes: pupils equal, round, and reactive to light, extraocular eye movements intact, conjunctivae normal  Neck: neck supple and non tender without mass   Pulmonary/Chest: clear to auscultation bilaterally- no wheezes, rales or rhonchi, normal air movement, no respiratory distress  Cardiovascular: normal rate, normal S1 and S2 and no carotid bruits  Abdomen: soft, non-tender, non-distended, normal bowel sounds, no masses or organomegaly  Extremities: no cyanosis, no clubbing and left knee in dressing, +1 edema bilateral lower ext. Neurologic: no cranial nerve deficit and speech normal      No results for input(s): NA, K, CL, CO2, BUN, CREATININE, GLUCOSE, CALCIUM in the last 72 hours.     Recent Labs     07/14/20  0325   HGB 15.2   HCT 44.3         Assessment:    Principal Problem:    Status post total left knee replacement  Active Problems:    Primary osteoarthritis of left knee  Resolved Problems:    * No resolved hospital problems. *      Plan:  1. Generative disease of left knee, status post left total knee arthroplasty, postop day 1, postop care with incentive spirometry, PT OT, pain management and DVT prophylaxis. 2.  History of hypertension, blood pressure wasn't controlled overnight, we ill recheck after taking his meds, on home amlodipine losartan and hydrochlorothiazide as well as Lopressor. 3.  History of bilateral lower extremity edema, patient claims that he was placed on hydrochlorothiazide for the edema, currently is running IV fluids, I will discontinue IV fluids if is okay with Ortho, consider starting patient on Lasix, patient is to have compression stockings on. NOTE: This report was transcribed using voice recognition software. Every effort was made to ensure accuracy; however, inadvertent computerized transcription errors may be present.   Electronically signed by Manisha Tee MD on 7/14/2020 at 8:28 AM

## 2020-07-14 NOTE — PROGRESS NOTES
[facility] Quality Flow/Interdisciplinary Rounds Progress Note        Quality Flow Rounds held on July 14, 2020    Disciplines Attending:  Bedside Nurse, ,  and Nursing Unit Leadership    Bre Jerome was admitted on 7/13/2020  9:40 AM    Anticipated Discharge Date:  Expected Discharge Date: 07/13/20    Disposition:    Navi Score:  Navi Scale Score: 21    Readmission Risk              Risk of Unplanned Readmission:        6           Discussed patient goal for the day, patient clinical progression, and barriers to discharge.   The following Goal(s) of the Day/Commitment(s) have been identified:  Discharge [address] [place]  July 14, 2020

## 2020-07-14 NOTE — PROGRESS NOTES
Physical Therapy  Facility/Department: [address] ORTHO SURGERY  Daily Treatment Note  NAME: Gumaro Millard  : 1955  MRN: [id number]    Date of Service: 2020          Patient Diagnosis(es): The encounter diagnosis was Status post total left knee replacement. has a past medical history of Arthritis, Hypertension, and PONV (postoperative nausea and vomiting). has a past surgical history that includes Knee arthroscopy (Left, ); shoulder surgery (Left, ); Facial Fracture Surgery; and [place] tooth extraction. Evaluating Therapist: Cesilia Lubin, PT      Referring Provider:  Dr. Vicky Salas     Room #: 475   [address] [place] [address] s/p L TKA 2020     PRECAUTIONS: falls, FWBAT      Social:  Pt lives with s.o. in a  2  floor plan   steps and no  rails to enter. Prior to admission pt walked with  No AD.  Has cane, ww        Initial Evaluation  Date:  2020 Treatment     2020 PM Short Term/ Long Term   Goals   Was pt agreeable to Eval/treatment?  yes   yes      Does pt have pain?  2/10 L knee pain   minimal knee pain, increased after mobility      Bed Mobility  Rolling:  NT   Supine to sit:  Min assist   Sit to supine:  NT   Scooting: min assist in sit    Supine <> sit : S/SBA  SBA    Transfers Sit to stand:  Min assist   Stand to sit: min assist   Stand pivot: min assist  Sit <> stand : S/SBA   SBA    Ambulation    15 feet and 50 feet x 1with ww  with min assist   400, 70 feet x 1 with ww  S/SBA   200 feet with  ww with  SBA          Stair negotiation: ascended and descended NT     4 steps x 2  with cane and no HR SBA/CGA  4  steps with no  rail with AAD CGA    LE ROM  AAROM L knee 5-90 degrees       LE strength  3+ to 4-/ 5 L knee in available range    4/ 5 L knee    AM- PAC RAW score  17 24   18               Pt is alert and Oriented x  3       Balance: S/SBA   Sensation:  WFL  Bed/Chair alarm: no     ASSESSMENT     Pt displays functional ability as noted in the objective portion of this treatment             Treatment/Education:    Mobility as above. Cues for proper technique and safety with all mobility. Increased instruction with sequencing on stair negotiation and cane use on steps   Performed supine QS, heel slides, SAQs, SLR . HEP issued and reviewed , Verbally instructed on car transfer technique      Pt educated on fall risk,  Safety and proper technique with all mobility, ww safety, LE exercises         Patient response to education:   Pt verbalized understanding Pt demonstrated skill Pt requires further education in this area   x Yes   x         Comments:  Pt left  In bed after session, with call light in reach.          PLAN     PT care will be provided in accordance with the objectives noted above. Matias Yale appropriate, clear delegation orders will be provided for nursing staff.  Exercises and functional mobility practice will be used as well as appropriate assistive devices or modalities to obtain goals. Patient and family education will also be administered as needed.     Frequency of treatments will be BID x 3 days.     Con't with PT POC . All goal grossly met  Plan is for discharge tomorrow.      Time KO:  7068   Time out:  1420            CPT codes:  []?? Low Complexity PT evaluation 31138  []? ? Moderate Complexity PT evaluation [id number]  []? Cornelious Girard Complexity PT evaluation [id number]  []?? PT Re-evaluation [id number]  []? ? Gait training 17663  minutes  [x]? ? Therapeutic activities 93577 25 minutes  [x]? ? Therapeutic exercises 09943 15  minutes  []? ? Neuromuscular reeducation [id number]  minutes         Arely 18 number:  PT [phone]

## 2020-07-14 NOTE — PROGRESS NOTES
Occupational Therapy  OT BEDSIDE TREATMENT NOTE      Date:2020  Patient Name: Ahsan Hernandez  MRN: [id number]  : 1955  Room: [address] [place] [address]     Referring Provider: Ellen Gee MD  Evaluating OT: Zachary Redd. Celestepineda, OTR/L - [id number]     AM-PAC Daily Activity Raw Score: 19      Recommended Adaptive Equipment: 3-in-1      Diagnosis: Status post total left knee replacement [Z96.652]   Surgery: Patient underwent L TKA on 2020. Pertinent Medical History: arthritis, HTN      Precautions: falls, FWBAT     Home Living: Patient lives with his significant other in a two-floor home (2+2 steps to enter - no handrail); patient's bedroom is on the second floor (no handrail on any steps within patient's home). Patient noted that he will not have to access the basement initially upon his return home. Bathroom Setup: tub shower (no seat, no grab bar)     Prior Level of Function (PLOF): Per patient, he was independent with ADLs, independent with IADLs, and independent with functional mobility prior to this hospitalization. Driving: Yes     Pain Level: Pt reports mild pain. Cognition: Pt alert and oriented.      Functional Assessment:    Initial Eval Status  Date: 2020 Treatment Status   Short Term Goals  Treatment Frequency/Duration: 2-5x/week for 3-7 days PRN   Feeding Independent       Grooming Min A  independent while standing at the sink  Independent  (standing at sink)   UB Dressing SBA Setup   Independent   LB Dressing Mod A to thread bilateral feet into disposable brief; Mod A to pull up and arrange brief while standing with walker.  min A to don shoe. Pt able to thread clothing over feet. Instructed with dressing technique. Wife present and states she will assist with L shoe.   Mod I / Independent - with use of AE, as needed/appropriate   Bathing Mod A Setup for bathing while standing at the sink.   Mod I / Independent - with use of AE/DME, as needed/appropriate   Toileting Min A with toilet transfer ([facility] frame over toilet); cues given occasionally to maximize safety. Supervision   Mod I / Indeendent   Bed Mobility  Supine-to-Sit: SBA    Independent   Functional Transfers Sit-to-Stand: Min A   from EOB SBA and cues for hand placement. Tub transfer using grab bar to step over tub surface. See comments.    Independent   Functional Mobility Min A   (with walker) within patient's room and bathroom Supervision using w/w   Mod I - with use of device, as needed/appropriate   Balance Sitting: Good  (at EOB)  Standing: Fair-  (with walker) Stand balance supervision   Fair+ dynamic standing balance during completion of ADLs and other functional tasks. Activity Tolerance Fair  fair +  Patient will demonstrate Good understanding and consistent implementation of energy conservation techniques and work simplification techniques into ADL/IADL routines. Comments:  Pt has tub jacuzzi without grab bar at home. Pt is able to step over standard tub surface however recommended pt complete tub transfer at home with home health to assess safety. No adaptive equipment needs at this time. Wife present during session and states she will assist. Jose hose sleeve provided and instructed pt and wife with use. No further questions from pt or wife regarding home safety or ADL. Education/treatment:  ADL retraining with facilitation of movement for self care. Therapeutic activity to address balance, strength, and endurance. Pt education of walker safety, transfer safety, and  home safety. · Pt has made  progress towards set goals.    · Continue with current plan of care        Treatment Charges: Mins Units    ADL/Home Mgt 20441 35 2    Thera Activities 84898 10 1    Ther Ex 02034      Manual Therapy 29260      Neuro Re-ed 91715      Orthotic manage/training  66424      Non Billable Time      Total Timed Treatment 45 3        Nunu Ordonez JESUS/L 16685

## 2020-07-14 NOTE — PLAN OF CARE
Problem: Falls - Risk of:  Goal: Will remain free from falls  Description: Will remain free from falls  Outcome: Met This Shift  Goal: Absence of physical injury  Description: Absence of physical injury  Outcome: Met This Shift     Problem: Discharge Planning:  Goal: Discharged to appropriate level of care  Description: Discharged to appropriate level of care  Outcome: Met This Shift     Problem: Mobility - Impaired:  Goal: Mobility will improve  Description: Mobility will improve  Outcome: Met This Shift     Problem: Infection - Surgical Site:  Goal: Will show no infection signs and symptoms  Description: Will show no infection signs and symptoms  Outcome: Met This Shift     Problem: Pain - Acute:  Goal: Pain level will decrease  Description: Pain level will decrease  Outcome: Met This Shift

## 2020-07-15 VITALS
RESPIRATION RATE: 16 BRPM | HEIGHT: 70 IN | SYSTOLIC BLOOD PRESSURE: 125 MMHG | OXYGEN SATURATION: 92 % | WEIGHT: 260 LBS | DIASTOLIC BLOOD PRESSURE: 58 MMHG | HEART RATE: 65 BPM | BODY MASS INDEX: 37.22 KG/M2 | TEMPERATURE: 97.4 F

## 2020-07-15 PROBLEM — M19.90 OSTEOARTHRITIS: Status: ACTIVE | Noted: 2020-07-15

## 2020-07-15 PROCEDURE — G0378 HOSPITAL OBSERVATION PER HR: HCPCS

## 2020-07-15 PROCEDURE — 6370000000 HC RX 637 (ALT 250 FOR IP): Performed by: ORTHOPAEDIC SURGERY

## 2020-07-15 PROCEDURE — 2580000003 HC RX 258: Performed by: ORTHOPAEDIC SURGERY

## 2020-07-15 PROCEDURE — 6360000002 HC RX W HCPCS: Performed by: ORTHOPAEDIC SURGERY

## 2020-07-15 PROCEDURE — 99024 POSTOP FOLLOW-UP VISIT: CPT | Performed by: ORTHOPAEDIC SURGERY

## 2020-07-15 PROCEDURE — 97530 THERAPEUTIC ACTIVITIES: CPT

## 2020-07-15 PROCEDURE — 99232 SBSQ HOSP IP/OBS MODERATE 35: CPT | Performed by: INTERNAL MEDICINE

## 2020-07-15 PROCEDURE — 96375 TX/PRO/DX INJ NEW DRUG ADDON: CPT

## 2020-07-15 RX ADMIN — METOPROLOL TARTRATE 25 MG: 25 TABLET, FILM COATED ORAL at 09:27

## 2020-07-15 RX ADMIN — DOCUSATE SODIUM 50 MG AND SENNOSIDES 8.6 MG 1 TABLET: 8.6; 5 TABLET, FILM COATED ORAL at 09:27

## 2020-07-15 RX ADMIN — OXYCODONE HYDROCHLORIDE 5 MG: 5 TABLET ORAL at 05:24

## 2020-07-15 RX ADMIN — ASPIRIN 325 MG: 325 TABLET, DELAYED RELEASE ORAL at 09:27

## 2020-07-15 RX ADMIN — ACETAMINOPHEN 650 MG: 325 TABLET ORAL at 11:28

## 2020-07-15 RX ADMIN — LOSARTAN POTASSIUM 100 MG: 50 TABLET ORAL at 09:27

## 2020-07-15 RX ADMIN — MORPHINE SULFATE 2 MG: 2 INJECTION, SOLUTION INTRAMUSCULAR; INTRAVENOUS at 09:28

## 2020-07-15 RX ADMIN — OXYCODONE HYDROCHLORIDE 5 MG: 5 TABLET ORAL at 12:43

## 2020-07-15 RX ADMIN — AMLODIPINE BESYLATE 10 MG: 10 TABLET ORAL at 09:27

## 2020-07-15 RX ADMIN — HYDROCHLOROTHIAZIDE 12.5 MG: 12.5 TABLET ORAL at 09:27

## 2020-07-15 RX ADMIN — ACETAMINOPHEN 650 MG: 325 TABLET ORAL at 05:24

## 2020-07-15 RX ADMIN — Medication 10 ML: at 09:30

## 2020-07-15 ASSESSMENT — PAIN SCALES - GENERAL
PAINLEVEL_OUTOF10: 5
PAINLEVEL_OUTOF10: 3
PAINLEVEL_OUTOF10: 6
PAINLEVEL_OUTOF10: 5
PAINLEVEL_OUTOF10: 4
PAINLEVEL_OUTOF10: 5
PAINLEVEL_OUTOF10: 9

## 2020-07-15 ASSESSMENT — PAIN DESCRIPTION - ONSET
ONSET: ON-GOING

## 2020-07-15 ASSESSMENT — PAIN DESCRIPTION - LOCATION
LOCATION: KNEE

## 2020-07-15 ASSESSMENT — PAIN DESCRIPTION - PAIN TYPE
TYPE: SURGICAL PAIN

## 2020-07-15 ASSESSMENT — PAIN DESCRIPTION - ORIENTATION
ORIENTATION: LEFT

## 2020-07-15 ASSESSMENT — PAIN DESCRIPTION - PROGRESSION
CLINICAL_PROGRESSION: GRADUALLY WORSENING
CLINICAL_PROGRESSION: GRADUALLY WORSENING
CLINICAL_PROGRESSION: GRADUALLY IMPROVING
CLINICAL_PROGRESSION: GRADUALLY WORSENING

## 2020-07-15 ASSESSMENT — PAIN DESCRIPTION - FREQUENCY
FREQUENCY: CONTINUOUS

## 2020-07-15 ASSESSMENT — PAIN - FUNCTIONAL ASSESSMENT
PAIN_FUNCTIONAL_ASSESSMENT: PREVENTS OR INTERFERES SOME ACTIVE ACTIVITIES AND ADLS
PAIN_FUNCTIONAL_ASSESSMENT: ACTIVITIES ARE NOT PREVENTED
PAIN_FUNCTIONAL_ASSESSMENT: PREVENTS OR INTERFERES SOME ACTIVE ACTIVITIES AND ADLS
PAIN_FUNCTIONAL_ASSESSMENT: ACTIVITIES ARE NOT PREVENTED
PAIN_FUNCTIONAL_ASSESSMENT: ACTIVITIES ARE NOT PREVENTED

## 2020-07-15 ASSESSMENT — PAIN DESCRIPTION - DESCRIPTORS
DESCRIPTORS: ACHING;DISCOMFORT;DULL
DESCRIPTORS: ACHING;DISCOMFORT;DULL
DESCRIPTORS: ACHING;DISCOMFORT
DESCRIPTORS: ACHING;DISCOMFORT;DULL
DESCRIPTORS: ACHING;DISCOMFORT;DULL

## 2020-07-15 NOTE — PROGRESS NOTES
in the objective portion of this treatment             Treatment/Education:    Mobility as above. Pt reports increased pain this AM and requested to walk shorter distances. Cues for proper technique and safety with all mobility. Instruction with pt and s.o. on  sequencing on stair negotiation and cane use on steps. S.o. assisted with 1 set on stair negotiation.        Pt educated on fall risk,  Safety and proper technique with all mobility, ww safety, LE exercises         Patient response to education:   Pt verbalized understanding Pt demonstrated skill Pt requires further education in this area   x Yes   x         Comments:  Pt left  In bed after session, with call light in reach.           PLAN     PT care will be provided in accordance with the objectives noted above. Garon Code appropriate, clear delegation orders will be provided for nursing staff.  Exercises and functional mobility practice will be used as well as appropriate assistive devices or modalities to obtain goals. Patient and family education will also be administered as needed.     Frequency of treatments will be BID x 3 days.     Con't with PT POC . All goals grossly met  Plan is for discharge today     Time QO:  1006   Time out:  0810           CPT codes:  []??? Low Complexity PT evaluation 90915  []? ?? Moderate Complexity PT evaluation [id number]  []??? High Complexity PT evaluation [id number]  []??? PT Re-evaluation [id number]  []??? Gait training [id number]  minutes  [x]? ?? Therapeutic activities 43345 34 LREBCTI  []? ?? Therapeutic exercises 69184 88  RGSDTFP  []? ?? Neuromuscular reeducation 88495  minutes         Arely Carter number:  PT [phone]

## 2020-07-15 NOTE — PLAN OF CARE
Problem: Falls - Risk of:  Goal: Will remain free from falls  Description: Will remain free from falls  Outcome: Met This Shift     Problem: Falls - Risk of:  Goal: Absence of physical injury  Description: Absence of physical injury  Outcome: Met This Shift     Problem: Discharge Planning:  Goal: Discharged to appropriate level of care  Description: Discharged to appropriate level of care  Outcome: Met This Shift     Problem: Mobility - Impaired:  Goal: Mobility will improve  Description: Mobility will improve  Outcome: Met This Shift     Problem: Infection - Surgical Site:  Goal: Will show no infection signs and symptoms  Description: Will show no infection signs and symptoms  7/15/2020 1052 by Pearl Steiner RN  Outcome: Met This Shift     Problem: Pain - Acute:  Goal: Pain level will decrease  Description: Pain level will decrease  Outcome: Met This Shift     Problem: Pain:  Goal: Pain level will decrease  Description: Pain level will decrease  Outcome: Met This Shift     Problem: Pain:  Goal: Control of acute pain  Description: Control of acute pain  Outcome: Met This Shift

## 2020-07-15 NOTE — PROGRESS NOTES
NURSES NOTE [facility]  POD # 2 LEFT KNEE TOTAL JOINT ARTHROPLASTY CONFORMIS    Subjective: \"My nerve block wore off and I've had a lot of knee pain. \" \"Getting better now but my knee is still real sore. \"                                                                                                                                           Objective/Assessment: Patient resting quietly in bed, dressed in street clothes. Wife present in the room.     Neuro:  Alert and oriented, pleasan and conversant  Appearance:  Mild to moderate distress related to post op ledft knee discomfort  Pain Control:  Effective with prescribed oral pain reliever  Breathing:  Patient denies CP or SOB  Saline haja:  Maintained   Appetite:  Restored   Voiding   Without difficulty  Abdomen:  Soft   Expelling Flatus:  No   BM:  No   Dressing:  Aquacel maintained, C/D/I  Motion:  Ambulated 100 feet x's 2 with standby assist, brandi 4 steps  Compartments:  Thigh / calf soft   Neuro / Circulatory: Intact          BP (!) 162/79   Pulse 78   Temp 98 °F (36.7 °C) (Oral)   Resp 16   Ht 5' 10\" (1.778 m)   Wt 260 lb (117.9 kg)   SpO2 93%   BMI 37.31 kg/m²     Recent Labs     07/14/20  0325   HGB 15.2   HCT 44.3   No blood work ordered for today    Plan:   Reviewed discharge instructions with patient and wife  Questions answered  Both express understanding and are in agreement with the plan  Discharge patient today  Home PT 3 x's a week until seen in the office  Patient will return to office Thursday, July 23 rd    Bill Romano RN, BSN, ONC, scribe for Dr. Sid Bee MD - Orthopedic Surgeon  7/15/2020 10:05 AM

## 2020-07-15 NOTE — PROGRESS NOTES
[facility] Progress Note    Admitting Date and Time: 7/13/2020  9:40 AM  Admit Dx: Status post total left knee replacement [Z96.652]    Subjective:  Patient is being followed for Status post total left knee replacement [Z96.652]   Pt feels ok, pain is manageable with pain meds    ROS: denies fever, chills, cp, sob, n/v, HA unless stated above.  amLODIPine  10 mg Oral Daily    metoprolol tartrate  25 mg Oral BID    sodium chloride flush  10 mL Intravenous 2 times per day    acetaminophen  650 mg Oral Q6H    sennosides-docusate sodium  1 tablet Oral BID    aspirin  325 mg Oral BID    losartan  100 mg Oral Daily    And    hydrochlorothiazide  12.5 mg Oral Daily     sodium chloride flush, 10 mL, PRN  morphine, 2 mg, Q3H PRN  magnesium hydroxide, 30 mL, Daily PRN  oxyCODONE, 5 mg, Q4H PRN         Objective:    BP (!) 162/79   Pulse 78   Temp 98 °F (36.7 °C) (Oral)   Resp 16   Ht 5' 10\" (1.778 m)   Wt 260 lb (117.9 kg)   SpO2 93%   BMI 37.31 kg/m²     General Appearance: alert and oriented to person, place and time and in no acute distress  Skin: warm and dry  Head: normocephalic and atraumatic  Eyes: pupils equal, round, and reactive to light, extraocular eye movements intact, conjunctivae normal  Neck: neck supple and non tender without mass   Pulmonary/Chest: clear to auscultation bilaterally- no wheezes, rales or rhonchi, normal air movement, no respiratory distress  Cardiovascular: normal rate, normal S1 and S2 and no carotid bruits  Abdomen: soft, non-tender, non-distended, normal bowel sounds, no masses or organomegaly  Extremities: no cyanosis, no clubbing and left knee in dressing, +1 edema bilateral lower ext. Neurologic: no cranial nerve deficit and speech normal      No results for input(s): NA, K, CL, CO2, BUN, CREATININE, GLUCOSE, CALCIUM in the last 72 hours.     Recent Labs     07/14/20  0325   HGB 15.2   HCT 44.3         Assessment:    Principal Problem:    Status post total left knee replacement  Active Problems:    Primary osteoarthritis of left knee  Resolved Problems:    * No resolved hospital problems. *      Plan:  1. Generative disease of left knee, status post left total knee arthroplasty, postop day 2, postop care with incentive spirometry, PT OT, pain management and DVT prophylaxis. 2.  History of hypertension, on home amlodipine losartan and hydrochlorothiazide as well as Lopressor. BP was above goal due to pain, and he hadn't received his BP meds, no change for now. 3.  History of bilateral lower extremity edema, patient claims that he was placed on hydrochlorothiazide for the edema, currently is running IV fluids, I will discontinue IV fluids if is okay with Ortho, consider starting patient on Lasix, patient is to have compression stockings on. Ok to discharge from medicine stand point. NOTE: This report was transcribed using voice recognition software. Every effort was made to ensure accuracy; however, inadvertent computerized transcription errors may be present.   Electronically signed by Nu Hernandez MD on 7/15/2020 at 8:55 AM

## 2020-07-15 NOTE — PROGRESS NOTES
CLINICAL PHARMACY NOTE: MEDS TO [address] Select Patient?: No  Total # of Prescriptions Filled: 2   The following medications were delivered to the patient:  · Adult 81 mg  · Percocet 5-325 mg  Total # of Interventions Completed: 7  Time Spent (min): 60    Additional Documentation:

## 2020-07-23 ENCOUNTER — OFFICE VISIT (OUTPATIENT)
Dept: ORTHOPEDIC SURGERY | Age: 65
End: 2020-07-23

## 2020-07-23 VITALS — HEIGHT: 70 IN | WEIGHT: 260 LBS | BODY MASS INDEX: 37.22 KG/M2 | TEMPERATURE: 97.5 F

## 2020-07-23 PROCEDURE — 99024 POSTOP FOLLOW-UP VISIT: CPT | Performed by: ORTHOPAEDIC SURGERY

## 2020-07-23 RX ORDER — OXYCODONE HYDROCHLORIDE AND ACETAMINOPHEN 5; 325 MG/1; MG/1
1 TABLET ORAL EVERY 8 HOURS PRN
Qty: 21 TABLET | Refills: 0 | Status: SHIPPED | OUTPATIENT
Start: 2020-07-23 | End: 2020-07-30

## 2020-07-23 RX ORDER — OXYCODONE HYDROCHLORIDE AND ACETAMINOPHEN 5; 325 MG/1; MG/1
1 TABLET ORAL EVERY 6 HOURS PRN
COMMUNITY
End: 2020-07-23 | Stop reason: ALTCHOICE

## 2020-07-24 NOTE — PROGRESS NOTES
Chief Complaint:   Chief Complaint   Patient presents with    Post-Op Check     Post Op Lt TKA 7/13/2020. Other than swelling, doing well. Ready for OPT PT. Kayleigh Kenney is 10 days status post Conformis customized left total knee arthroplasty, intraoperatively found to have significant stiffness with quadriceps tension otherwise procedure was unremarkable. Did very well immediately postop but did spend 2 nights due to his residence more than 2 hours from the hospital.  Having expected pain which she is managing well with medication. No fever chills sweats chest pain or dyspnea. Ambulatory with a single cane. Has had home physical therapy and feels ready to advance to outpatient therapy. Allergies; medications; past medical, surgical, family, and social history; and problem list have been reviewed today and updated as indicated in this encounter seen below. Exam: Left knee incision is healing without erythema fluctuance or drainage, there is a mild benign-appearing effusion in the knee, and expected mild to moderate periarticular soft tissue swelling warmth and resolving ecchymosis. Active assisted motion from 5 to 80 degrees of flexion, stable to medial lateral and AP stress. Extensor mechanism is palpably and functionally intact, patellar tracking appears neutral.  Calf is soft nontender negative Homans. Radiographs: AP lateral x-rays of the left knee today show the customized tricompartmental components to be well fit and fixed in proper position with restoration of what appears to be normal limb alignment. Consistent with normal postoperative x-ray appearance left total knee. Brandi Amaro was seen today for post-op check. Diagnoses and all orders for this visit:    S/P total knee replacement, left  -     XR KNEE LEFT (1-2 VIEWS)  -     Amb External Referral To Physical Therapy  -     oxyCODONE-acetaminophen (PERCOCET) 5-325 MG per tablet;  Take 1 tablet by mouth every 8 hours as needed for Pain for up to 7 days. Doing well, patient will advance to outpatient therapy, Percocet was renewed with recommendation to transition over-the-counter's as feasible. Activities at home and weightbearing to tolerance questions asked and answered follow-up in 1 month for clinical exam.      Return in about 1 month (around 8/23/2020). Current Outpatient Medications   Medication Sig Dispense Refill    oxyCODONE-acetaminophen (PERCOCET) 5-325 MG per tablet Take 1 tablet by mouth every 8 hours as needed for Pain for up to 7 days. 21 tablet 0    aspirin EC 81 MG EC tablet Take 1 tablet by mouth 2 times daily for 28 days 56 tablet 0    loratadine (CLARITIN) 10 MG capsule Take 10 mg by mouth as needed      amLODIPine (NORVASC) 10 MG tablet Take 10 mg by mouth daily Instructed to take am of procedure      bisoprolol (ZEBETA) 5 MG tablet Take 5 mg by mouth daily Instructed to take am of procedure      losartan-hydrochlorothiazide (HYZAAR) 100-12.5 MG per tablet Take 1 tablet by mouth daily       Omega-3 Fatty Acids (FISH OIL) 1200 MG CAPS Take 1 capsule by mouth 2 times daily Ld 7/8/2020      Glucosamine-Chondroit-Vit C-Mn (GLUCOSAMINE CHONDR 1500 COMPLX PO) Take 1 capsule by mouth 2 times daily Ld 7/8/2020      l-arginine 500 MG capsule Take 500 mg by mouth 2 times daily Ld 7/8/2020      Cholecalciferol (VITAMIN D3 PO) Take 1 capsule by mouth 2 times daily Ld 7/8/2020      Multiple Vitamins-Minerals (CENTRUM ADULTS PO) Take 1 capsule by mouth daily Ld 7/8/2020       No current facility-administered medications for this visit.         Patient Active Problem List   Diagnosis    Status post total left knee replacement    Primary osteoarthritis of left knee    Osteoarthritis       Past Medical History:   Diagnosis Date    Arthritis     Hypertension     PONV (postoperative nausea and vomiting)        Past Surgical History:   Procedure Laterality Date    FACIAL FRACTURE SURGERY      Maxilla    KNEE Negative for cough, shortness of breath and wheezing. Cardiovascular: Negative for chest pain and palpitations. Neurological: Negative for dizziness, syncope, cephalgia. GI / : negative  Musculoskeletal: see HPI       Objective:   Physical Exam   Constitutional: Oriented to person, place, and time. and appears well-developed and well-nourished. :   Head: Normocephalic and atraumatic. Eyes: EOM are normal.   Neck: Neck supple. Cardiovascular: Normal rate and regular rhythm. Pulmonary/Chest: Effort normal. No stridor. No respiratory distress, no wheezes. Abdominal:  No abnormal distension. Neurological: Alert and oriented to person, place, and time. Skin: Skin is warm and dry. Psychiatric: Normal mood and affect.  Behavior is normal. Thought content normal.    7/24/2020  10:41 AM

## 2020-07-28 NOTE — DISCHARGE SUMMARY
Physician Discharge Summary     Patient ID:  Thad Davidson  [id number]  1 y.o.  1955    Admit date: 7/13/2020    Discharge date and time: 7/15/2020  1:47 PM     Admitting Physician: Arlet Hill MD     Discharge Physician: SAME    Admission Diagnoses: Status post total left knee replacement [Z96.652]  Osteoarthritis [M19.90]    Discharge Diagnoses: SAME    Admission Condition: good    Discharged Condition: good    Indication for Admission: Left PAIN FROM [facility] DISABLING AND REFRACTORY TO [address] Course: SEE NOTES    Consults: none    Significant Diagnostic Studies: labs: SEE RESULTS    Treatments: surgery: Left total knee arthroplasty    Discharge Exam:  Extremities: Homans sign is negative, no sign of DVT and no edema, redness or tenderness in the calves or thighs    Disposition: home    Patient Instructions:   Discharge Medication List as of 7/15/2020 11:45 AM      START taking these medications    Details   aspirin EC 81 MG EC tablet Take 1 tablet by mouth 2 times daily for 28 days, Disp-56 tablet,R-0Print      oxyCODONE-acetaminophen (PERCOCET) 5-325 MG per tablet Take 1 tablet by mouth every 6 hours as needed for Pain for up to 7 days. Intended supply: 7 days.  Take lowest dose possible to manage pain, Disp-28 tablet,R-0Print         CONTINUE these medications which have NOT CHANGED    Details   loratadine (CLARITIN) 10 MG capsule Take 10 mg by mouth as neededHistorical Med      amLODIPine (NORVASC) 10 MG tablet Take 10 mg by mouth daily Instructed to take am of procedureHistorical Med      bisoprolol (ZEBETA) 5 MG tablet Take 5 mg by mouth daily Instructed to take am of procedureHistorical Med      losartan-hydrochlorothiazide (HYZAAR) 100-12.5 MG per tablet Take 1 tablet by mouth daily Historical Med      Omega-3 Fatty Acids (FISH OIL) 1200 MG CAPS Take 1 capsule by mouth 2 times daily Ld 7/8/2020Historical Med      Glucosamine-Chondroit-Vit C-Mn (GLUCOSAMINE CHONDR 1500 COMPLX PO) Take 1 capsule by mouth 2 times daily Ld 7/8/2020Historical Med      l-arginine 500 MG capsule Take 500 mg by mouth 2 times daily Ld 7/8/2020Historical Med      Cholecalciferol (VITAMIN D3 PO) Take 1 capsule by mouth 2 times daily Ld 7/8/2020Historical Med      Multiple Vitamins-Minerals (CENTRUM ADULTS PO) Take 1 capsule by mouth daily Ld 7/8/2020Historical Med           Activity: no heavy lifting, pushing, pulling with the implant side for 2 months  Diet: regular diet  Wound Care: keep wound clean and dry    Follow-up with MD ROSA in 1 week.     Signed:  7/28/2020  7:31 AM

## 2020-08-14 ASSESSMENT — KOOS JR
STRAIGHTENING KNEE FULLY: 2
TWISING OR PIVOTING ON KNEE: 2
STANDING UPRIGHT: 2
HOW SEVERE IS YOUR KNEE STIFFNESS AFTER FIRST WAKING IN MORNING: 2
BENDING TO THE FLOOR TO PICK UP OBJECT: 1
GOING UP OR DOWN STAIRS: 2
RISING FROM SITTING: 1

## 2020-08-14 ASSESSMENT — PROMIS GLOBAL HEALTH SCALE
IN THE PAST 7 DAYS, HOW WOULD YOU RATE YOUR PAIN ON AVERAGE [ON A SCALE FROM 0 (NO PAIN) TO 10 (WORST IMAGINABLE PAIN)]?: 4
HOW IS THE PROMIS V1.1 BEING ADMINISTERED?: 2
IN GENERAL, HOW WOULD YOU RATE YOUR MENTAL HEALTH, INCLUDING YOUR MOOD AND YOUR ABILITY TO THINK [ON A SCALE OF 1 (POOR) TO 5 (EXCELLENT)]?: 4
IN THE PAST 7 DAYS, HOW WOULD YOU RATE YOUR FATIGUE ON AVERAGE [ON A SCALE FROM 1 (NONE) TO 5 (VERY SEVERE)]?: 3
IN GENERAL, WOULD YOU SAY YOUR QUALITY OF LIFE IS...[ON A SCALE OF 1 (POOR) TO 5 (EXCELLENT)]: 3
IN THE PAST 7 DAYS, HOW OFTEN HAVE YOU BEEN BOTHERED BY EMOTIONAL PROBLEMS, SUCH AS FEELING ANXIOUS, DEPRESSED, OR IRRITABLE [ON A SCALE FROM 1 (NEVER) TO 5 (ALWAYS)]?: 2
IN GENERAL, WOULD YOU SAY YOUR HEALTH IS...[ON A SCALE OF 1 (POOR) TO 5 (EXCELLENT)]: 3
IN GENERAL, HOW WOULD YOU RATE YOUR SATISFACTION WITH YOUR SOCIAL ACTIVITIES AND RELATIONSHIPS [ON A SCALE OF 1 (POOR) TO 5 (EXCELLENT)]?: 4
IN GENERAL, PLEASE RATE HOW WELL YOU CARRY OUT YOUR USUAL SOCIAL ACTIVITIES (INCLUDES ACTIVITIES AT HOME, AT WORK, AND IN YOUR COMMUNITY, AND RESPONSIBILITIES AS A PARENT, CHILD, SPOUSE, EMPLOYEE, FRIEND, ETC) [ON A SCALE OF 1 (POOR) TO 5 (EXCELLENT)]?: 3
IN GENERAL, HOW WOULD YOU RATE YOUR PHYSICAL HEALTH [ON A SCALE OF 1 (POOR) TO 5 (EXCELLENT)]?: 3
TO WHAT EXTENT ARE YOU ABLE TO CARRY OUT YOUR EVERYDAY PHYSICAL ACTIVITIES SUCH AS WALKING, CLIMBING STAIRS, CARRYING GROCERIES, OR MOVING A CHAIR [ON A SCALE OF 1 (NOT AT ALL) TO 5 (COMPLETELY)]?: 3

## 2020-08-27 ENCOUNTER — OFFICE VISIT (OUTPATIENT)
Dept: ORTHOPEDIC SURGERY | Age: 65
End: 2020-08-27

## 2020-08-27 VITALS — WEIGHT: 247 LBS | TEMPERATURE: 96.8 F | HEIGHT: 70 IN | BODY MASS INDEX: 35.36 KG/M2

## 2020-08-27 PROCEDURE — 99024 POSTOP FOLLOW-UP VISIT: CPT | Performed by: ORTHOPAEDIC SURGERY

## 2020-08-27 RX ORDER — OXYCODONE HYDROCHLORIDE AND ACETAMINOPHEN 5; 325 MG/1; MG/1
1 TABLET ORAL EVERY 8 HOURS PRN
Qty: 15 TABLET | Refills: 0 | Status: SHIPPED | OUTPATIENT
Start: 2020-08-27 | End: 2020-09-01

## 2020-08-28 NOTE — PROGRESS NOTES
Chief Complaint:   Chief Complaint   Patient presents with    Post-Op Check     FU Lt TKA 7/13/2020. Doing well. Has discomfort at night if very active during the day. Discuss pain medication. Check incision. Hanane Henriquez is about 6 weeks after left total knee arthroplasty using conformis components. Still having pain approximately equivalent to that which he had before surgery but his activities are steadily improving, he feels he is making progress and remains optimistic about his ultimate result. Ambulating independently and performing all necessary activities at home and work. No fever chills sweats or other systemic symptom. Still taking occasional Percocet before bed for pain, also takes Tylenol during the daytime but not every day. Allergies; medications; past medical, surgical, family, and social history; and problem list have been reviewed today and updated as indicated in this encounter seen below. Exam: Left knee incision is healed without erythema fluctuance or drainage, there is a very mild benign effusion no erythema minimally tender, patellar tracking normal, range of motion though limited from 15 to 100 degrees of flexion. Endpoints are relatively soft however. Knee is stable to medial lateral and AP stress. Calf is soft nonswollen nontender. Negative Homans. Radiographs: Deferred today previous x-rays postoperatively reviewed showing customized components including patellar resurfacing to be well fit and fixed in proper alignment. Joi Ferguson was seen today for post-op check. Diagnoses and all orders for this visit:    S/P total knee replacement, left  -     oxyCODONE-acetaminophen (PERCOCET) 5-325 MG per tablet; Take 1 tablet by mouth every 8 hours as needed for Pain for up to 5 days. Although the patient is making slower progress than hoped for he is steadily improving and is functional with daily activities including reciprocal stair climbing.   We did discuss that even under anesthesia his left knee was very tight and he did have limited motion preoperatively which would help to explain his slow progress at this point. He will continue with therapeutic exercises, I did renew his Percocet but counseled him that he should continue transitioning to over-the-counter's as needed. Questions asked and answered follow-up in 6 weeks for clinical exam repeat x-rays of pain persist.      Return in about 6 weeks (around 10/8/2020). Current Outpatient Medications   Medication Sig Dispense Refill    oxyCODONE-acetaminophen (PERCOCET) 5-325 MG per tablet Take 1 tablet by mouth every 8 hours as needed for Pain for up to 5 days. 15 tablet 0    aspirin EC 81 MG EC tablet Take 1 tablet by mouth 2 times daily for 28 days 56 tablet 0    loratadine (CLARITIN) 10 MG capsule Take 10 mg by mouth as needed      amLODIPine (NORVASC) 10 MG tablet Take 10 mg by mouth daily Instructed to take am of procedure      bisoprolol (ZEBETA) 5 MG tablet Take 5 mg by mouth daily Instructed to take am of procedure      losartan-hydrochlorothiazide (HYZAAR) 100-12.5 MG per tablet Take 1 tablet by mouth daily       Omega-3 Fatty Acids (FISH OIL) 1200 MG CAPS Take 1 capsule by mouth 2 times daily Ld 7/8/2020      Glucosamine-Chondroit-Vit C-Mn (GLUCOSAMINE CHONDR 1500 COMPLX PO) Take 1 capsule by mouth 2 times daily Ld 7/8/2020      l-arginine 500 MG capsule Take 500 mg by mouth 2 times daily Ld 7/8/2020      Cholecalciferol (VITAMIN D3 PO) Take 1 capsule by mouth 2 times daily Ld 7/8/2020      Multiple Vitamins-Minerals (CENTRUM ADULTS PO) Take 1 capsule by mouth daily Ld 7/8/2020       No current facility-administered medications for this visit.         Patient Active Problem List   Diagnosis    Status post total left knee replacement    Primary osteoarthritis of left knee    Osteoarthritis       Past Medical History:   Diagnosis Date    Arthritis     Hypertension     PONV (postoperative nausea and vomiting)        Past Surgical History:   Procedure Laterality Date    FACIAL FRACTURE SURGERY      Maxilla    KNEE ARTHROSCOPY Left 2013    SHOULDER SURGERY Left 1981    TOTAL KNEE ARTHROPLASTY Left 7/13/2020    LEFT KNEE TOTAL ARTHROPLASTY (Jeanie Ch) performed by Jennifer Bee MD at [address] [facility] [address] EXTRACTION         Allergies   Allergen Reactions    Neosporin [Neomycin-Polymyxin-Gramicidin] Rash       Social History     Socioeconomic History    Marital status:      Spouse name: None    Number of children: None    Years of education: None    Highest education level: None   Occupational History    None   Social Needs    Financial resource strain: None    Food insecurity     Worry: None     Inability: None    Transportation needs     Medical: None     Non-medical: None   Tobacco Use    Smoking status: Never Smoker    Smokeless tobacco: Never Used   Substance and Sexual Activity    Alcohol use:  Yes     Alcohol/week: 4.0 standard drinks     Types: 4 Cans of beer per week     Comment: Socially    Drug use: Yes     Types: Marijuana     Comment: occasionally    Sexual activity: None   Lifestyle    Physical activity     Days per week: None     Minutes per session: None    Stress: None   Relationships    Social connections     Talks on phone: None     Gets together: None     Attends Anabaptism service: None     Active member of club or organization: None     Attends meetings of clubs or organizations: None     Relationship status: None    Intimate partner violence     Fear of current or ex partner: None     Emotionally abused: None     Physically abused: None     Forced sexual activity: None   Other Topics Concern    None   Social History Narrative    None       Family History   Problem Relation Age of Onset    Hypertension Father     Heart Disease Father     Hypertension Brother     Heart Disease Brother          Review of Systems  As follows except as previously noted in HPI:  Constitutional: Negative for chills, diaphoresis, fatigue, fever and unexpected weight change. Respiratory: Negative for cough, shortness of breath and wheezing. Cardiovascular: Negative for chest pain and palpitations. Neurological: Negative for dizziness, syncope, cephalgia. GI / : negative  Musculoskeletal: see HPI       Objective:   Physical Exam   Constitutional: Oriented to person, place, and time. and appears well-developed and well-nourished. :   Head: Normocephalic and atraumatic. Eyes: EOM are normal.   Neck: Neck supple. Cardiovascular: Normal rate and regular rhythm. Pulmonary/Chest: Effort normal. No stridor. No respiratory distress, no wheezes. Abdominal:  No abnormal distension. Neurological: Alert and oriented to person, place, and time. Skin: Skin is warm and dry. Psychiatric: Normal mood and affect.  Behavior is normal. Thought content normal.    8/27/2020  10:35 PM

## 2020-10-15 ENCOUNTER — OFFICE VISIT (OUTPATIENT)
Dept: ORTHOPEDIC SURGERY | Age: 65
End: 2020-10-15

## 2020-10-15 VITALS — BODY MASS INDEX: 35.36 KG/M2 | HEIGHT: 70 IN | WEIGHT: 247 LBS | TEMPERATURE: 97.4 F

## 2020-10-15 PROCEDURE — 99024 POSTOP FOLLOW-UP VISIT: CPT | Performed by: ORTHOPAEDIC SURGERY

## 2020-10-16 NOTE — PROGRESS NOTES
Chief Complaint:   Chief Complaint   Patient presents with    Post-Op Check     Left total knee arthroplasty. 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 Severa Living is now 3 months after [employer] customized left total knee arthroplasty, patient's activity level has picked up he is doing a lot of work around the farm but does not feel comfortable going back to his previous work as yet. No fever chills sweats or other systemic symptom. Notes persisting stiffness without much improvement despite physical therapy, feels that his motion is approaching that which he had preoperatively, feels that his pain is persistent approximately as much as it was preoperatively if somewhat different. No pain at rest, pain seems to be activity related for the most part. Denies any feeling of locking or instability. Using over-the-counter's on occasion for pain. Allergies; medications; past medical, surgical, family, and social history; and problem list have been reviewed today and updated as indicated in this encounter seen below. Exam: Left knee incision is healed without erythema fluctuance or drainage, there is a very mild benign suprapatellar effusion on full extension, range of motion measured today 5 to 95 degrees of flexion with soft endpoint in flexion, the knee shows normal alignment with excellent stability to medial lateral and AP stress through the full range of motion noted. Calf is soft nontender negative Homans. Knee shows no erythema minimal periarticular warmth as I would expect. Radiographs: Deferred at this time. Wilmer Self was seen today for post-op check. Diagnoses and all orders for this visit:    S/P total knee replacement, left       Patient slow progress was acknowledged, he did have stiffness preoperatively and he voices understanding that he may take longer than expected to recover fully.   He will continue with therapeutic exercises on his own, otherwise increase activities to tolerance, observe for signs of regression. He will let us know in the meantime if he is comfortable going back to work otherwise follow-up in 3 months for clinical and x-ray exams of the left total knee. Return in about 3 months (around 1/15/2021) for xray. Current Outpatient Medications   Medication Sig Dispense Refill    loratadine (CLARITIN) 10 MG capsule Take 10 mg by mouth as needed      amLODIPine (NORVASC) 10 MG tablet Take 10 mg by mouth daily Instructed to take am of procedure      bisoprolol (ZEBETA) 5 MG tablet Take 5 mg by mouth daily Instructed to take am of procedure      losartan-hydrochlorothiazide (HYZAAR) 100-12.5 MG per tablet Take 1 tablet by mouth daily       Omega-3 Fatty Acids (FISH OIL) 1200 MG CAPS Take 1 capsule by mouth 2 times daily Ld 7/8/2020      Glucosamine-Chondroit-Vit C-Mn (GLUCOSAMINE CHONDR 1500 COMPLX PO) Take 1 capsule by mouth 2 times daily Ld 7/8/2020      l-arginine 500 MG capsule Take 500 mg by mouth 2 times daily Ld 7/8/2020      Cholecalciferol (VITAMIN D3 PO) Take 1 capsule by mouth 2 times daily Ld 7/8/2020      Multiple Vitamins-Minerals (CENTRUM ADULTS PO) Take 1 capsule by mouth daily Ld 7/8/2020      aspirin EC 81 MG EC tablet Take 1 tablet by mouth 2 times daily for 28 days (Patient not taking: Reported on 10/15/2020) 56 tablet 0     No current facility-administered medications for this visit.         Patient Active Problem List   Diagnosis    Status post total left knee replacement    Primary osteoarthritis of left knee    Osteoarthritis       Past Medical History:   Diagnosis Date    Arthritis     Hypertension     PONV (postoperative nausea and vomiting)        Past Surgical History:   Procedure Laterality Date    FACIAL FRACTURE SURGERY      [facility]    KNEE ARTHROSCOPY Left 2013    SHOULDER SURGERY Left 1981    TOTAL KNEE ARTHROPLASTY Left 7/13/2020    LEFT KNEE TOTAL ARTHROPLASTY (Brock Cousins) performed by Gt Juarez MD at [address] [facility] EXTRACTION         Allergies   Allergen Reactions    Neosporin [Neomycin-Polymyxin-Gramicidin] Rash       Social History     Socioeconomic History    Marital status:      Spouse name: None    Number of children: None    Years of education: None    Highest education level: None   Occupational History    None   Social Needs    Financial resource strain: None    Food insecurity     Worry: None     Inability: None    Transportation needs     Medical: None     Non-medical: None   Tobacco Use    Smoking status: Never Smoker    Smokeless tobacco: Never Used   Substance and Sexual Activity    Alcohol use: Yes     Alcohol/week: 4.0 standard drinks     Types: 4 Cans of beer per week     Comment: Socially    Drug use: Yes     Types: Marijuana     Comment: seldom    Sexual activity: None   Lifestyle    Physical activity     Days per week: None     Minutes per session: None    Stress: None   Relationships    Social connections     Talks on phone: None     Gets together: None     Attends Ep[employer]opalian service: None     Active member of club or organization: None     Attends meetings of clubs or organizations: None     Relationship status: None    Intimate partner violence     Fear of current or ex partner: None     Emotionally abused: None     Physically abused: None     Forced sexual activity: None   Other Topics Concern    None   Social History Narrative    None       Family History   Problem Relation Age of Onset    Hypertension Father     Heart Disease Father     Hypertension Brother     Heart Disease Brother          Review of Systems  As follows except as previously noted in HPI:  Constitutional: Negative for chills, diaphoresis, fatigue, fever and unexpected weight change. Respiratory: Negative for cough, shortness of breath and wheezing. Cardiovascular: Negative for chest pain and palpitations.    Neurological: Negative for dizziness, syncope, cephalgia. GI / : negative  Musculoskeletal: see HPI       Objective:   Physical Exam   Constitutional: Oriented to person, place, and time. and appears well-developed and well-nourished. :   Head: Normocephalic and atraumatic. Eyes: EOM are normal.   Neck: Neck supple. Cardiovascular: Normal rate and regular rhythm. Pulmonary/Chest: Effort normal. No stridor. No respiratory distress, no wheezes. Abdominal:  No abnormal distension. Neurological: Alert and oriented to person, place, and time. Skin: Skin is warm and dry. Psychiatric: Normal mood and affect.  Behavior is normal. Thought content normal.    10/16/2020  8:44 AM

## 2020-11-12 ENCOUNTER — OFFICE VISIT (OUTPATIENT)
Dept: ORTHOPEDIC SURGERY | Age: 65
End: 2020-11-12
Payer: COMMERCIAL

## 2020-11-12 VITALS — TEMPERATURE: 97.2 F | BODY MASS INDEX: 35.36 KG/M2 | WEIGHT: 247 LBS | HEIGHT: 70 IN

## 2020-11-12 PROCEDURE — 99213 OFFICE O/P EST LOW 20 MIN: CPT | Performed by: ORTHOPAEDIC SURGERY

## 2020-11-12 NOTE — PROGRESS NOTES
Chief Complaint:   Chief Complaint   Patient presents with    Office Visit for Anticoagulation Management     F/u left total knee replacement. States knee is \"real tight and has pain in knee as the day goes on\". States he stays pretty active, works on stationary bike 3 times a week for 45 minutes. Does home stretching exercises on other days. Elizabeth Yang is now 4 months after left total knee arthroplasty, has not really made further progress in terms of pain and stiffness, was considering going back to work but at this point does not feel capable of doing so for these reasons. Reports compliance with home exercise and following stretching exercises and riding his bike does feel that he is making very very slow progress in terms of range of motion. Reports overall that his pain is proximal equal to what it was preoperatively. Denies fever chills sweats chest pain or dyspnea. Concerned that with stiffness he may not be able to perform appropriate work activities as yet. Allergies; medications; past medical, surgical, family, and social history; and problem list have been reviewed today and updated as indicated in this encounter seen below. Exam: Leg lengths equal hip motion painless, right knee benign. Left knee incision remains healed without erythema fluctuance or drainage, there is no effusion although there is very mild periarticular soft tissue swelling no warmth, mild tenderness in the soft tissues. Active assisted range of motion today from 10 to perhaps 90 degrees of flexion. Knee is stable to medial lateral and AP stress. Patellar tracking normal.    Radiographs: Deferred today benign clinical exam.    oTy Stark was seen today for office visit for anticoagulation management.     Diagnoses and all orders for this visit:    S/P total knee replacement, left        Patient states uncertainty as to whether he would feel comfortable going back to work at this point or not he may or may not be able to comply with work expectations. We discussed the etiology of his stiffness and he acknowledges that his knee had become more stiff prior to surgery and this we discussed that does increase the probability of him having this problem however given the customized nature of the components and the intraoperative motion was achieved I do think ultimately this will come along further, I think it is reasonable for him to stay off work until January 2, continue with home exercise, when he does return to work he may have restrictions limiting his tolerance for longer periods of standing walking and flexion activities such as ladders or stairs. Questions asked and answered follow-up in January for repeat exam and x-rays left knee. - Counseling More Than 50% of the Appointment Time: 15 minutes    Return in about 2 months (around 1/12/2021). Current Outpatient Medications   Medication Sig Dispense Refill    loratadine (CLARITIN) 10 MG capsule Take 10 mg by mouth as needed      amLODIPine (NORVASC) 10 MG tablet Take 10 mg by mouth daily Instructed to take am of procedure      bisoprolol (ZEBETA) 5 MG tablet Take 5 mg by mouth daily Instructed to take am of procedure      losartan-hydrochlorothiazide (HYZAAR) 100-12.5 MG per tablet Take 1 tablet by mouth daily       Omega-3 Fatty Acids (FISH OIL) 1200 MG CAPS Take 1 capsule by mouth 2 times daily Ld 7/8/2020      Glucosamine-Chondroit-Vit C-Mn (GLUCOSAMINE CHONDR 1500 COMPLX PO) Take 1 capsule by mouth 2 times daily Ld 7/8/2020      l-arginine 500 MG capsule Take 500 mg by mouth 2 times daily Ld 7/8/2020      Cholecalciferol (VITAMIN D3 PO) Take 1 capsule by mouth 2 times daily Ld 7/8/2020      Multiple Vitamins-Minerals (CENTRUM ADULTS PO) Take 1 capsule by mouth daily Ld 7/8/2020       No current facility-administered medications for this visit.         Patient Active Problem List   Diagnosis    Status post total left knee replacement    Primary osteoarthritis of left knee    Osteoarthritis       Past Medical History:   Diagnosis Date    Arthritis     Hypertension     PONV (postoperative nausea and vomiting)        Past Surgical History:   Procedure Laterality Date    FACIAL FRACTURE SURGERY      Maxilla    KNEE ARTHROSCOPY Left 2013    SHOULDER SURGERY Left 1981    TOTAL KNEE ARTHROPLASTY Left 7/13/2020    LEFT KNEE TOTAL ARTHROPLASTY (Keshav Wu) performed by Cherelle Cooper MD at [facility] EXTRACTION         Allergies   Allergen Reactions    Neosporin [Neomycin-Polymyxin-Gramicidin] Rash       Social History     Socioeconomic History    Marital status:      Spouse name: None    Number of children: None    Years of education: None    Highest education level: None   Occupational History    None   Social Needs    Financial resource strain: None    Food insecurity     Worry: None     Inability: None    Transportation needs     Medical: None     Non-medical: None   Tobacco Use    Smoking status: Never Smoker    Smokeless tobacco: Never Used   Substance and Sexual Activity    Alcohol use:  Yes     Alcohol/week: 4.0 standard drinks     Types: 4 Cans of beer per week     Comment: Socially    Drug use: Yes     Types: Marijuana     Comment: seldom    Sexual activity: None   Lifestyle    Physical activity     Days per week: None     Minutes per session: None    Stress: None   Relationships    Social connections     Talks on phone: None     Gets together: None     Attends Hoahaoism service: None     Active member of club or organization: None     Attends meetings of clubs or organizations: None     Relationship status: None    Intimate partner violence     Fear of current or ex partner: None     Emotionally abused: None     Physically abused: None     Forced sexual activity: None   Other Topics Concern    None   Social History Narrative    None       Family History   Problem Relation Age of Onset    Hypertension Father     Heart Disease Father     Hypertension Brother     Heart Disease Brother          Review of Systems  As follows except as previously noted in HPI:  Constitutional: Negative for chills, diaphoresis, fatigue, fever and unexpected weight change. Respiratory: Negative for cough, shortness of breath and wheezing. Cardiovascular: Negative for chest pain and palpitations. Neurological: Negative for dizziness, syncope, cephalgia. GI / : negative  Musculoskeletal: see HPI       Objective:   Physical Exam   Constitutional: Oriented to person, place, and time. and appears well-developed and well-nourished. :   Head: Normocephalic and atraumatic. Eyes: EOM are normal.   Neck: Neck supple. Cardiovascular: Normal rate and regular rhythm. Pulmonary/Chest: Effort normal. No stridor. No respiratory distress, no wheezes. Abdominal:  No abnormal distension. Neurological: Alert and oriented to person, place, and time. Skin: Skin is warm and dry. Psychiatric: Normal mood and affect.  Behavior is normal. Thought content normal.    11/12/2020  3:12 PM

## 2020-12-03 ENCOUNTER — TELEPHONE (OUTPATIENT)
Dept: ORTHOPEDIC SURGERY | Age: 65
End: 2020-12-03

## 2020-12-03 NOTE — TELEPHONE ENCOUNTER
12/03/2020 10:55 am Patient phoned office / message left on voice mail: Would like a release to RTW.    12/03/2020 12:25 pm Follow up phone call [phone] / spoke with patient who reports: Plan on returning to work on December 14 th w/ no restrictions.  Send letter to home address on [address].    Letter printed and placed on your desk for signature

## 2020-12-03 NOTE — LETTER
[address] [facility].  [address] [address]  Phone: [phone]  Fax: [phone]    Jacquie Blanco MD        December 3, 2020     Patient: Rosita Riley   YOB: 1955   Date of Visit: 12/3/2020       To Whom It May Concern: It is my medical opinion that Nicci Hernandez may return to work on December 14, 2020 with no     restrictions. If you have any questions or concerns, please don't hesitate to call.     Sincerely,        Jacquie Blanco MD

## 2021-01-21 ENCOUNTER — OFFICE VISIT (OUTPATIENT)
Dept: ORTHOPEDIC SURGERY | Age: 66
End: 2021-01-21
Payer: COMMERCIAL

## 2021-01-21 VITALS — HEIGHT: 70 IN | WEIGHT: 250 LBS | TEMPERATURE: 97 F | BODY MASS INDEX: 35.79 KG/M2

## 2021-01-21 DIAGNOSIS — Z96.652 S/P TOTAL KNEE REPLACEMENT, LEFT: Primary | ICD-10-CM

## 2021-01-21 PROCEDURE — 99213 OFFICE O/P EST LOW 20 MIN: CPT | Performed by: ORTHOPAEDIC SURGERY

## 2021-01-22 NOTE — PROGRESS NOTES
Chief Complaint:   Chief Complaint   Patient presents with    Knee Problem     Six months out left total knee arthroplasty. Still has stiffness, feels he is slowly improving. He's is back to work. Kay Villafana is now 6 months after [employer] custom left total knee arthroplasty. He feels he is making steady progress in terms of pain function and range of motion with ongoing exercise, he has gone back to work and feels this has been helpful in those regards. He rarely takes over-the-counter for pain and overall feels that his pain is finally all persistent better than it was preoperatively. In terms of stiffness he acknowledges that his knee had become stiff prior to the surgery and feels that his motion at this point is approximately equivalent. States he is able to intentionally climb and descend stairs in a reciprocal fashion with some difficulty. Denies fever chills sweats, no other joint complaints. Allergies; medications; past medical, surgical, family, and social history; and problem list have been reviewed today and updated as indicated in this encounter seen below. Exam: Leg lengths equal hip motion painless, right knee remains benign. Left knee shows a healed anterior incision without erythema warmth or effusion. There is no crepitus, the knee is well aligned and stable to medial lateral and AP stress but active assisted range of motion today is 0 to perhaps 95 degrees of flexion with a soft endpoint in flexion. Joint lines and tibial plateau are nontender to palpation. Radiographs: AP lateral x-rays of the left knee today show the tricompartmental components to remain normally aligned and overall well fit and fixed although there is a small area of lucency beneath the medial portion of the tibial component. Paulina Pitts was seen today for knee problem.     Diagnoses and all orders for this visit:    S/P total knee replacement, left  -     XR KNEE LEFT (1-2 VIEWS) Patient was advised as to the radiographic findings and the possible implications although this may be an incidental finding. His primary issue is persisting stiffness which has we discussed his not totally unexpected given his preoperative limited motion. He was encouraged to continue his efforts at range of motion increase other activities to tolerance, use over-the-counter's as needed for pain. Questions asked and answered follow-up in 6 months for 1 year checkup to include x-rays of the left knee, he will see us sooner if symptoms are progressive.    - Counseling More Than 50% of the Appointment Time: 20 minutes    Return in about 6 months (around 7/21/2021). Current Outpatient Medications   Medication Sig Dispense Refill    loratadine (CLARITIN) 10 MG capsule Take 10 mg by mouth as needed      amLODIPine (NORVASC) 10 MG tablet Take 10 mg by mouth daily Instructed to take am of procedure      bisoprolol (ZEBETA) 5 MG tablet Take 5 mg by mouth daily Instructed to take am of procedure      losartan-hydrochlorothiazide (HYZAAR) 100-12.5 MG per tablet Take 1 tablet by mouth daily       Omega-3 Fatty Acids (FISH OIL) 1200 MG CAPS Take 1 capsule by mouth 2 times daily Ld 7/8/2020      Glucosamine-Chondroit-Vit C-Mn (GLUCOSAMINE CHONDR 1500 COMPLX PO) Take 1 capsule by mouth 2 times daily Ld 7/8/2020      l-arginine 500 MG capsule Take 500 mg by mouth 2 times daily Ld 7/8/2020      Cholecalciferol (VITAMIN D3 PO) Take 1 capsule by mouth 2 times daily Ld 7/8/2020      Multiple Vitamins-Minerals (CENTRUM ADULTS PO) Take 1 capsule by mouth daily Ld 7/8/2020       No current facility-administered medications for this visit.         Patient Active Problem List   Diagnosis    Status post total left knee replacement    Primary osteoarthritis of left knee    Osteoarthritis       Past Medical History:   Diagnosis Date    Arthritis     Hypertension     PONV (postoperative nausea and vomiting) Past Surgical History:   Procedure Laterality Date    FACIAL FRACTURE SURGERY      Maxilla    KNEE ARTHROSCOPY Left 2013    SHOULDER SURGERY Left 1981    TOTAL KNEE ARTHROPLASTY Left 7/13/2020    LEFT KNEE TOTAL ARTHROPLASTY (Berkley Ontiveros) performed by Jacquie Daniel MD at [address] EXTRACTION         Allergies   Allergen Reactions    Neosporin [Neomycin-Polymyxin-Gramicidin] Rash       Social History     Socioeconomic History    Marital status:      Spouse name: None    Number of children: None    Years of education: None    Highest education level: None   Occupational History    None   Social Needs    Financial resource strain: None    Food insecurity     Worry: None     Inability: None    Transportation needs     Medical: None     Non-medical: None   Tobacco Use    Smoking status: Never Smoker    Smokeless tobacco: Never Used   Substance and Sexual Activity    Alcohol use:  Yes     Alcohol/week: 4.0 standard drinks     Types: 4 Cans of beer per week     Comment: Socially    Drug use: Yes     Types: Marijuana     Comment: seldom    Sexual activity: None   Lifestyle    Physical activity     Days per week: None     Minutes per session: None    Stress: None   Relationships    Social connections     Talks on phone: None     Gets together: None     Attends Druze service: None     Active member of club or organization: None     Attends meetings of clubs or organizations: None     Relationship status: None    Intimate partner violence     Fear of current or ex partner: None     Emotionally abused: None     Physically abused: None     Forced sexual activity: None   Other Topics Concern    None   Social History Narrative    None       Family History   Problem Relation Age of Onset    Hypertension Father     Heart Disease Father     Hypertension Brother     Heart Disease Brother          Review of Systems  As follows except as previously noted in HPI: Constitutional: Negative for chills, diaphoresis, fatigue, fever and unexpected weight change. Respiratory: Negative for cough, shortness of breath and wheezing. Cardiovascular: Negative for chest pain and palpitations. Neurological: Negative for dizziness, syncope, cephalgia. GI / : negative  Musculoskeletal: see HPI       Objective:   Physical Exam   Constitutional: Oriented to person, place, and time. and appears well-developed and well-nourished. :   Head: Normocephalic and atraumatic. Eyes: EOM are normal.   Neck: Neck supple. Cardiovascular: Normal rate and regular rhythm. Pulmonary/Chest: Effort normal. No stridor. No respiratory distress, no wheezes. Abdominal:  No abnormal distension. Neurological: Alert and oriented to person, place, and time. Skin: Skin is warm and dry. Psychiatric: Normal mood and affect.  Behavior is normal. Thought content normal.    1/22/2021  8:30 AM

## (undated) DEVICE — Z INACTIVE USE 2660664 SOLUTION IRRIG 3000ML 0.9% SOD CHL USP UROMATIC PLAS CONT

## (undated) DEVICE — BNDG,ELSTC,MATRIX,STRL,6"X5YD,LF,HOOK&LP: Brand: MEDLINE

## (undated) DEVICE — NEEDLE HYPO 18GA L1.5IN PNK POLYPR HUB S STL REG BVL STR

## (undated) DEVICE — BASIC SINGLE BASIN 1-LF: Brand: MEDLINE INDUSTRIES, INC.

## (undated) DEVICE — GOWN,SIRUS,POLYRNF,BRTHSLV,LG,30/CS: Brand: MEDLINE

## (undated) DEVICE — STERILE PVP: Brand: MEDLINE INDUSTRIES, INC.

## (undated) DEVICE — SOLUTION IV 500ML 0.9% SOD CHL PH 5 INJ USP VIAFLX PLAS

## (undated) DEVICE — ELECTRODE PT RET AD L9FT HI MOIST COND ADH HYDRGEL CORDED

## (undated) DEVICE — RETRACTOR INGE (LAMINA SPREADER)

## (undated) DEVICE — DECANTER: Brand: UNBRANDED

## (undated) DEVICE — STRIP,CLOSURE,WOUND,MEDI-STRIP,1/2X4: Brand: MEDLINE

## (undated) DEVICE — SOLUTION IV IRRIG WATER 1000ML POUR BRL 2F7114

## (undated) DEVICE — SWABSTICK SURG PREP BENZOIN TINCTURE SINGLE ST

## (undated) DEVICE — BLADE SAW SAG SYS 6 18X90X1.27MM

## (undated) DEVICE — BANDAGE COMPR W6INXL12FT SMOOTH FOR LIMB EXSANG ESMARCH

## (undated) DEVICE — INTENDED FOR TISSUE SEPARATION, AND OTHER PROCEDURES THAT REQUIRE A SHARP SURGICAL BLADE TO PUNCTURE OR CUT.: Brand: BARD-PARKER ® STAINLESS STEEL BLADES

## (undated) DEVICE — ZIMMER® STERILE DISPOSABLE TOURNIQUET CUFF WITH PLC, DUAL PORT, SINGLE BLADDER, 34 IN. (86 CM)

## (undated) DEVICE — TOTAL HIP PK

## (undated) DEVICE — 4-PORT MANIFOLD: Brand: NEPTUNE 2

## (undated) DEVICE — TOTAL KNEE PK

## (undated) DEVICE — RETRACTOR SURG POST CRUCE LIG

## (undated) DEVICE — DOUBLE BASIN SET: Brand: MEDLINE INDUSTRIES, INC.

## (undated) DEVICE — TUBE IRRIG HNDPC HI FLO TP INTRPULS W/SUCTION TUBE

## (undated) DEVICE — SUTURE VCRL + SZ 3-0 L18IN ABSRB UD PS-1 L24MM 3/8 CIR PRIM VCP683G

## (undated) DEVICE — PACK PROCEDURE SURG GEN CUST

## (undated) DEVICE — TOWEL,OR,DSP,ST,BLUE,STD,6/PK,12PK/CS: Brand: MEDLINE

## (undated) DEVICE — DRAPE,TOP,102X53,STERILE: Brand: MEDLINE

## (undated) DEVICE — KIT PLT RATIO DISPNS KT 2IN CANN TIP SPRY TIP DISP MAGELLAN

## (undated) DEVICE — SPONGE LAP W18XL18IN WHT COT 4 PLY FLD STRUNG RADPQ DISP ST

## (undated) DEVICE — COVER HNDL LT DISP

## (undated) DEVICE — TAPE ADH W3INXL10YD WHT COT WVN BK POWERFUL RUB BASE HIGHLY

## (undated) DEVICE — GOWN,SIRUS,POLYRNF,BRTHSLV,XL,30/CS: Brand: MEDLINE

## (undated) DEVICE — BOWL AND CEMENT CARTRIDGE WITH BREAKAWAY FEMORAL NOZZLE: Brand: ACM

## (undated) DEVICE — TUBING, SUCTION, 9/32" X 10', STRAIGHT: Brand: MEDLINE

## (undated) DEVICE — Z DISCONTINUED PER MEDLINE USE 2741943 DRESSING AQUACEL 10 IN ALG W9XL25CM SIL CVR WTRPRF VIR BACT BARR ANTIMIC

## (undated) DEVICE — 3M™ COBAN™ NL STERILE NON-LATEX SELF-ADHERENT WRAP, 2084S, 4 IN X 5 YD (10 CM X 4,5 M), 18 ROLLS/CASE: Brand: 3M™ COBAN™

## (undated) DEVICE — PADDING CAST W6INXL4YD COT LO LINTING WYTEX

## (undated) DEVICE — SOLUTION IV IRRIG POUR BRL 0.9% SODIUM CHL 2F7124